# Patient Record
Sex: MALE | Race: WHITE | Employment: FULL TIME | URBAN - METROPOLITAN AREA
[De-identification: names, ages, dates, MRNs, and addresses within clinical notes are randomized per-mention and may not be internally consistent; named-entity substitution may affect disease eponyms.]

---

## 2018-05-01 ENCOUNTER — RECORDS - HEALTHEAST (OUTPATIENT)
Dept: LAB | Facility: CLINIC | Age: 23
End: 2018-05-01

## 2018-05-01 LAB
ALBUMIN SERPL-MCNC: 4.3 G/DL (ref 3.5–5)
ANION GAP SERPL CALCULATED.3IONS-SCNC: 12 MMOL/L (ref 5–18)
BUN SERPL-MCNC: 19 MG/DL (ref 8–22)
CALCIUM SERPL-MCNC: 10 MG/DL (ref 8.5–10.5)
CHLORIDE BLD-SCNC: 102 MMOL/L (ref 98–107)
CO2 SERPL-SCNC: 25 MMOL/L (ref 22–31)
CREAT SERPL-MCNC: 0.95 MG/DL (ref 0.7–1.3)
GFR SERPL CREATININE-BSD FRML MDRD: >60 ML/MIN/1.73M2
GLUCOSE BLD-MCNC: 85 MG/DL (ref 70–125)
PHOSPHATE SERPL-MCNC: 3.8 MG/DL (ref 2.5–4.5)
POTASSIUM BLD-SCNC: 4.1 MMOL/L (ref 3.5–5)
SODIUM SERPL-SCNC: 139 MMOL/L (ref 136–145)

## 2019-12-05 ENCOUNTER — HOSPITAL ENCOUNTER (EMERGENCY)
Facility: CLINIC | Age: 24
Discharge: HOME | End: 2019-12-05
Attending: FAMILY MEDICINE | Admitting: FAMILY MEDICINE
Payer: COMMERCIAL

## 2019-12-05 VITALS
SYSTOLIC BLOOD PRESSURE: 143 MMHG | DIASTOLIC BLOOD PRESSURE: 84 MMHG | OXYGEN SATURATION: 99 % | HEART RATE: 84 BPM | TEMPERATURE: 98.9 F | RESPIRATION RATE: 20 BRPM

## 2019-12-05 DIAGNOSIS — R45.851 SUICIDAL IDEATION: ICD-10-CM

## 2019-12-05 DIAGNOSIS — F12.10 MARIJUANA ABUSE: ICD-10-CM

## 2019-12-05 DIAGNOSIS — F41.1 GAD (GENERALIZED ANXIETY DISORDER): ICD-10-CM

## 2019-12-05 DIAGNOSIS — F10.10 ALCOHOL ABUSE: ICD-10-CM

## 2019-12-05 PROCEDURE — 90791 PSYCH DIAGNOSTIC EVALUATION: CPT

## 2019-12-05 PROCEDURE — 99285 EMERGENCY DEPT VISIT HI MDM: CPT | Mod: 25

## 2019-12-05 PROCEDURE — 99284 EMERGENCY DEPT VISIT MOD MDM: CPT | Mod: Z6 | Performed by: FAMILY MEDICINE

## 2019-12-05 PROCEDURE — 25000132 ZZH RX MED GY IP 250 OP 250 PS 637: Performed by: FAMILY MEDICINE

## 2019-12-05 RX ORDER — HYDROXYZINE HYDROCHLORIDE 25 MG/1
50 TABLET, FILM COATED ORAL ONCE
Status: COMPLETED | OUTPATIENT
Start: 2019-12-05 | End: 2019-12-05

## 2019-12-05 RX ORDER — HYDROXYZINE HYDROCHLORIDE 25 MG/1
25-50 TABLET, FILM COATED ORAL EVERY 6 HOURS PRN
Qty: 28 TABLET | Refills: 0 | Status: SHIPPED | OUTPATIENT
Start: 2019-12-05

## 2019-12-05 RX ADMIN — HYDROXYZINE HYDROCHLORIDE 50 MG: 25 TABLET, FILM COATED ORAL at 21:02

## 2019-12-05 ASSESSMENT — ENCOUNTER SYMPTOMS
DYSURIA: 0
NAUSEA: 0
SINUS PRESSURE: 0
DYSPHORIC MOOD: 1
DIAPHORESIS: 0
SHORTNESS OF BREATH: 0
SORE THROAT: 0
COUGH: 0
CHILLS: 0
FREQUENCY: 0
WHEEZING: 0
VOMITING: 0
NERVOUS/ANXIOUS: 1
BLOOD IN STOOL: 0
ABDOMINAL PAIN: 0
CONSTIPATION: 0
FEVER: 0
HEADACHES: 1
DIARRHEA: 0
PALPITATIONS: 0

## 2019-12-05 NOTE — ED AVS SNAPSHOT
[facility] Emergency Department  [address] [address]  Phone:  [phone]  Fax:  [phone]                                    Souleymane Zurita   MRN: [id number]    Department:  [facility] Emergency Department   Date of Visit:  12/5/2019           After Visit Summary Signature Page    I have received my discharge instructions, and my questions have been answered. I have discussed any challenges I see with this plan with the nurse or doctor.    ..........................................................................................................................................  Patient/Patient Representative Signature      ..........................................................................................................................................  Patient Representative Print Name and Relationship to Patient    ..................................................               ................................................  Date                                   Time    ..........................................................................................................................................  Reviewed by Signature/Title    ...................................................              ..............................................  Date                                               Time          22EPIC Rev 08/18

## 2019-12-06 NOTE — ED PROVIDER NOTES
History     Chief Complaint   Patient presents with     Anxiety     HPI  Souleymane Zurita is a 24 year old male who presents with history of alcohol abuse and through chemotherapy treatment successfully sober for 8 months this past year then relapsed and returned to treatment but felt uncomfortable with the second treatment program and left early.  Since that time is had intermittent alcohol use at least a couple times a week.  He notes that he also uses daily heavy marijuana use with 2 ounces per week often he has been out of this recently and without any money.  He has been gone from work for the last 3 months on leave and will be returning there in approximately 2 weeks.       Today after having used alcohol last night, vodka, then today feeling more anxious and upset and easily angered.  His dog ran off earlier and he became angry and punched the walls.  Many of his home utilities are turned off including his TV because he cannot pay the bill.  He can however live with his aunt and is considered doing this.  He is accompanied by his mother who is concerned for him.  He did have suicidal ideation today as he has had previously but has no planning or preparation no intent to do this.  Denies wanting to take pills or use weapons and he has no weapons in the home.  No prior suicidal attempts in the past.  No hallucinations.  He has had significant generalized anxiety symptoms for some time with periodic sense of panic in which he feels like he is going to die.  These are without obvious trigger.  Symptoms were controlled with hydroxyzine although he recently ran out of this.  He also was started on Effexor and has been on this for 1 week but taking it during the daytime and does not feel right when he is on it.  Feels worse now that he is taking 2 tabs per day.        Allergies:  No Known Allergies    Problem List:    There are no active problems to display for this patient.       Past Medical History:    No past  medical history on file.    Past Surgical History:    No past surgical history on file.    Family History:    No family history on file.    Social History:  Marital Status:    Social History     Tobacco Use     Smoking status: Not on file   Substance Use Topics     Alcohol use: Not on file     Drug use: Not on file        Medications:    No current outpatient medications on file.        Review of Systems   Constitutional: Negative for chills, diaphoresis and fever.   HENT: Negative for ear pain, sinus pressure and sore throat.    Eyes: Negative for visual disturbance.   Respiratory: Negative for cough, shortness of breath and wheezing.    Cardiovascular: Negative for chest pain and palpitations.   Gastrointestinal: Negative for abdominal pain, blood in stool, constipation, diarrhea, nausea and vomiting.   Genitourinary: Negative for dysuria, frequency and urgency.   Skin: Negative for rash.   Neurological: Positive for headaches.   Psychiatric/Behavioral: Positive for dysphoric mood, self-injury and suicidal ideas. The patient is nervous/anxious.    All other systems reviewed and are negative.      Physical Exam          Physical Exam  Constitutional:       General: He is not in acute distress.     Appearance: He is not ill-appearing.   HENT:      Nose: Nose normal.      Mouth/Throat:      Mouth: Mucous membranes are moist.      Pharynx: No oropharyngeal exudate.   Eyes:      Conjunctiva/sclera: Conjunctivae normal.   Neck:      Musculoskeletal: Neck supple.   Cardiovascular:      Rate and Rhythm: Normal rate and regular rhythm.      Pulses: Normal pulses.      Heart sounds: No murmur. No friction rub. No gallop.    Pulmonary:      Effort: Pulmonary effort is normal. No respiratory distress.      Breath sounds: Normal breath sounds. No stridor. No wheezing or rhonchi.   Abdominal:      General: Abdomen is flat. Bowel sounds are normal. There is no distension.      Palpations: There is no mass.      Tenderness: There  is no abdominal tenderness.      Hernia: No hernia is present.   Musculoskeletal:      Right lower leg: No edema.      Left lower leg: No edema.   Skin:     Coloration: Skin is not pale.      Findings: No rash.   Neurological:      Mental Status: He is alert and oriented to person, place, and time.      Sensory: No sensory deficit.      Motor: No weakness.   Psychiatric:         Mood and Affect: Mood is anxious and depressed. Affect is not labile, blunt, angry or tearful.         Speech: He is communicative. Speech is not rapid and pressured, delayed, slurred or tangential.         Behavior: Behavior is not agitated, slowed, aggressive or withdrawn.         Thought Content: Thought content includes suicidal ideation. Thought content does not include suicidal plan.         Cognition and Memory: Cognition is not impaired. Memory is not impaired.       Bilateral knuckles are slightly abraded from punching walls today.  No obvious signs of fracture full range of motion full-strength.      ED Course        Procedures               Critical Care time:  none               No results found for this or any previous visit (from the past 24 hour(s)).    Medications   hydrOXYzine (ATARAX) tablet 50 mg (has no administration in time range)       Assessments & Plan (with Medical Decision Making)     MDM: Souleymane Zurita is a 24 year old male who presented this evening for progressive anxiety disorder that had been treated with Vistaril successfully until he ran out of Vistaril.  Had been planning to see mental health and has been self treating his anxiety with both alcohol for which he went through treatment and has relapsed and with marijuana with heavy use on a daily basis.  He had also had suicidal ideation today without planning or preparation and had this before and felt safe today.  He has no weapons in the home or other significant risks.  He had recently been started on Effexor and was titrating upwards but had side  effects on this.  We did discuss taking it at nighttime instead of the day.    He met with myself as well as with Lizett at the [facility] and both of us agreed that the patient appears to be safe.  Today he was accompanied by his mother.  He has resources around him including several family members we can stay with at this point.  Lizett is set up [facility] to contact the patient tomorrow to help set up what is likely going to be intensive day treatment dual diagnosis.  I discussed this with the patient's mother and the patien and he would like to pursue this.t he was given Vistaril for home.  We will have him take the Effexor at nighttime.  He is encouraged to return immediately for suicidal planning or preparation or increased suicidal ideation.    I have reviewed the nursing notes.    I have reviewed the findings, diagnosis, plan and need for follow up with the patient.       New Prescriptions    No medications on file       Final diagnoses:   DEVORAH (generalized anxiety disorder) - I recommend intensive day treatment - expect phone call from mental health tomorrow,  see also [facility] in discharge. take atarax as needed. stay on effexir at night. consider one tab 37,5 mg nightly starting tonight.  if waiting until tomorrow night then take 2 tomorrow.   Marijuana abuse   Alcohol abuse - avoid alcohol and marijuana and follow-up mental health , prefer dual diagnosis   Suicidal ideation - make sure there are no weapons in the home.  return immed for planning or preparation. follow-up mental health and primary provider in the next week       12/5/2019   [facility] EMERGENCY DEPARTMENT     Bin Huang MD  12/06/19 [id number]

## 2019-12-06 NOTE — DISCHARGE INSTRUCTIONS
ICD-10-CM    1. DEVORAH (generalized anxiety disorder) F41.1     I recommend intensive day treatment - expect phone call from mental health tomorrow,  see also [facility] in discharge. take atarax as needed. stay on effexir at night. consider one tab 37,5 mg nightly starting tonight.  if waiting until tomorrow night then take 2 tomorrow.   2. Marijuana abuse F12.10    3. Alcohol abuse F10.10     avoid alcohol and marijuana and follow-up mental health , prefer dual diagnosis   4. Suicidal ideation R45.851     make sure there are no weapons in the home.  return immed for planning or preparation. follow-up mental health and primary provider in the next week

## 2019-12-06 NOTE — ED TRIAGE NOTES
Pt has problems with depression and anxiety, pt denies thoughts of harming himself now, says anxiety is the problem and has been very bothersome for him.

## 2019-12-06 NOTE — ED NOTES
Went through treatment for etoh abuse twice, 8 months ago and 2 months ago  Pt was sober from alcohol for two months  Relapsed on alcohol last night, had anxiety from running out of marijuana, last drink 1400 today  States his dog ran away, punched the walls.  Laid off work without pay  Anxiety and depression today, ran out of vistaril 3 days ago.   Taking effexor QAM  Suicidal thoughts earlier today, no plan, currently denies SI at this time  States he wishes to hurt his dad but has no plan

## 2019-12-06 NOTE — ED TRIAGE NOTES
Pt has been trying to get a psych eval, has been through tx for ETOH, was on vistaril for anxiety but ran out.

## 2020-05-22 ENCOUNTER — HOSPITAL ENCOUNTER (OUTPATIENT)
Dept: BEHAVIORAL HEALTH | Facility: CLINIC | Age: 25
Discharge: HOME | End: 2020-05-22
Attending: FAMILY MEDICINE | Admitting: FAMILY MEDICINE
Payer: MEDICAID

## 2020-05-22 PROCEDURE — 90791 PSYCH DIAGNOSTIC EVALUATION: CPT | Mod: GT

## 2020-05-22 ASSESSMENT — ANXIETY QUESTIONNAIRES
2. NOT BEING ABLE TO STOP OR CONTROL WORRYING: SEVERAL DAYS
3. WORRYING TOO MUCH ABOUT DIFFERENT THINGS: SEVERAL DAYS
5. BEING SO RESTLESS THAT IT IS HARD TO SIT STILL: SEVERAL DAYS
1. FEELING NERVOUS, ANXIOUS, OR ON EDGE: NEARLY EVERY DAY
6. BECOMING EASILY ANNOYED OR IRRITABLE: NEARLY EVERY DAY
GAD7 TOTAL SCORE: 12
IF YOU CHECKED OFF ANY PROBLEMS ON THIS QUESTIONNAIRE, HOW DIFFICULT HAVE THESE PROBLEMS MADE IT FOR YOU TO DO YOUR WORK, TAKE CARE OF THINGS AT HOME, OR GET ALONG WITH OTHER PEOPLE: SOMEWHAT DIFFICULT
7. FEELING AFRAID AS IF SOMETHING AWFUL MIGHT HAPPEN: SEVERAL DAYS

## 2020-05-22 ASSESSMENT — PATIENT HEALTH QUESTIONNAIRE - PHQ9
5. POOR APPETITE OR OVEREATING: MORE THAN HALF THE DAYS
SUM OF ALL RESPONSES TO PHQ QUESTIONS 1-9: 22

## 2020-05-22 ASSESSMENT — COLUMBIA-SUICIDE SEVERITY RATING SCALE - C-SSRS
TOTAL  NUMBER OF ABORTED OR SELF INTERRUPTED ATTEMPTS PAST 3 MONTHS: NO
ATTEMPT PAST THREE MONTHS: NO
TOTAL  NUMBER OF ABORTED OR SELF INTERRUPTED ATTEMPTS LIFETIME: NO
ATTEMPT LIFETIME: NO
3. HAVE YOU BEEN THINKING ABOUT HOW YOU MIGHT KILL YOURSELF?: YES
TOTAL  NUMBER OF INTERRUPTED ATTEMPTS LIFETIME: NO
4. HAVE YOU HAD THESE THOUGHTS AND HAD SOME INTENTION OF ACTING ON THEM?: NO
5. HAVE YOU STARTED TO WORK OUT OR WORKED OUT THE DETAILS OF HOW TO KILL YOURSELF? DO YOU INTEND TO CARRY OUT THIS PLAN?: NO
1. HAVE YOU WISHED YOU WERE DEAD OR WISHED YOU COULD GO TO SLEEP AND NOT WAKE UP?: NO
TOTAL  NUMBER OF INTERRUPTED ATTEMPTS PAST 3 MONTHS: NO
2. HAVE YOU ACTUALLY HAD ANY THOUGHTS OF KILLING YOURSELF?: YES
1. IN THE PAST MONTH, HAVE YOU WISHED YOU WERE DEAD OR WISHED YOU COULD GO TO SLEEP AND NOT WAKE UP?: NO
2. HAVE YOU ACTUALLY HAD ANY THOUGHTS OF KILLING YOURSELF?: YES
4. HAVE YOU HAD THESE THOUGHTS AND HAD SOME INTENTION OF ACTING ON THEM?: NO
6. HAVE YOU EVER DONE ANYTHING, STARTED TO DO ANYTHING, OR PREPARED TO DO ANYTHING TO END YOUR LIFE?: NO
5. HAVE YOU STARTED TO WORK OUT OR WORKED OUT THE DETAILS OF HOW TO KILL YOURSELF? DO YOU INTEND TO CARRY OUT THIS PLAN?: NO
6. HAVE YOU EVER DONE ANYTHING, STARTED TO DO ANYTHING, OR PREPARED TO DO ANYTHING TO END YOUR LIFE?: NO

## 2020-05-22 NOTE — PROGRESS NOTES
"Souleymane Zurita is a 25 year old male who is being evaluated via a billable video visit.      The patient has been notified of following:     \"This video visit will be conducted via a call between you and your physician/provider. We have found that certain health care needs can be provided without the need for an in-person physical exam.  This service lets us provide the care you need with a video conversation.  If a prescription is necessary we can send it directly to your pharmacy.  If lab work is needed we can place an order for that and you can then stop by our lab to have the test done at a later time.    Video visits are billed at different rates depending on your insurance coverage. Please reach out to your insurance provider with any questions.    If during the course of the call the physician/provider feels a video visit is not appropriate, you will not be charged for this service.\"    Patient has given verbal consent for Video visit? Yes    How would you like to obtain your AVS? Mail a copy    Patient would like the video invitation sent by: Send to e-mail at: [email or web address]    Will anyone else be joining your video visit? No        Video-Visit Details    Type of service:  Video Visit    Video Start Time: 1:30pm  Video End Time: 3:00 PM    Originating Location (pt. Location): Home    Distant Location (provider location):  [facility]     Platform used for Video Visit: PABLO Parnell       Adult Dual Diagnosis Day Treatment  Evaluator Name:  Danyelle Singh     Credentials:  PABLO CARDONA    PATIENT'S NAME: Souleymane Zurita  PREFERRED NAME: Pedro  PREFERRED PRONOUNS: He/Him  MRN:   [id number]  :   1995   ACCT. NUMBER: [id number]  DATE OF SERVICE: 20  START TIME: 1:30pm  END TIME: 3:00pm  PREFERRED PHONE: [email or web address] or [phone] (Mother's phone - patient doesn't have a phone right now)  May we leave a program related message: Yes (email or on mother's " "phone)    STANDARD ADULT DIAGNOSTIC ASSESSMENT    Telemedicine Visit: The patient's condition can be safely assessed and treated via synchronous audio and visual telemedicine encounter.      Reason for Telemedicine Visit: Services only offered telehealth    Originating Site (Patient Location): Patient's home    Distant Site (Provider Location): Provider Remote Setting    Consent:  The patient/guardian has verbally consented to: the potential risks and benefits of telemedicine (video visit) versus in person care; bill my insurance or make self-payment for services provided; and responsibility for payment of non-covered services.     Mode of Communication:  Video Conference via [employer]    As the provider I attest to compliance with applicable laws and regulations related to telemedicine.    Identifying Information:  Patient is a 25 year old, . The pronoun use throughout this assessment reflects the patient's chosen pronoun.  Patient was referred for an assessment by self. Patient attended the session alone.     Chief Complaint:   The reason for seeking services at this time is: \"Mental health problems and substance abuse problems\". Last drink was on Sunday, 5/17 with longest period of sobriety from alcohol being 7 months. The problem(s) began in middle school but roughly 2/3 years ago things got \"pretty bad\". Patient has attempted to resolve these concerns in the past through [facility] (inpatient x2 - most recent February 2020).    Does the client have any condition that is currently presenting as a potential to harm themselves or others (severe withdrawal, serious medical condition, severe emotional/behavioral problem)? No.  Proceed with assessment.    Social/Family History:  Patient reported they grew up in [place], [place]. They were raised by biological parents around 1st or 2nd grade. Biological father eventually remarried. Patient also reports a half sister older.  Patient reported that his " "childhood was \"okay\". Patient described their current relationships with family of origin as \"okay - mom, aunt and cousin are my biggest support\".      The patient describes their cultural background as \"I don't know much, honestly. I guess I am atheist if that counts\". Cultural influences and impact on patient's life structure, values, norms, and healthcare: parents  at early age, limited social support, suburban up-bringing. These factors will be addressed in the Preliminary Treatment plan. Patient identified their preferred language to be English. Patient reported they does not need the assistance of an  or other support involved in therapy.     Patient reported had no significant delays in developmental tasks \"but I did really bad in school, I just hated it\". Patient's highest education level was associate degree / vocational certificate - automotive science. Patient identified the following learning problems: attention and concentration. Modifications will not be used to assist communication in therapy. Patient reports they are able to understand written materials    Patient reported the following relationship history: Patient's current relationship status is single. Patient identified their sexual orientation as heterosexual. Patient reported having zero child(kana).     Patient's current living/housing situation involves staying in own home/apartment. They live with \"dog (Waldo Buckley) - Max\" and they report that housing is stable. Patient identified mother and aunt as part of their support system. Patient further reports \"I've never really had to many friends\". Patient identified the quality of the relationships with his mother and aunt as stable and meaningful.     Patient is currently unemployed - since January/February. Patient was working as an  for several years, then went to an [employer] show. Patient reports their finances are obtained through unemployment.  Patient " "does identify finances as a current stressor.      Patient reported that they have not been involved with the legal system. Patient denies being on probation / parole / under the jurisdiction of the court.      Patient's Strengths and Limitations:  Patient identified the following strengths or resources that will help them succeed in treatment: family support. Things that may interfere with the patient's success in treatment include: few friends, financial hardship and \"myself and getting in my own way\".     _______________________________________________  Personal and Family Medical History:   Patient did report a family history of mental health concerns. Patient reports family history includes Unknown/Adopted in his paternal grandmother..     Patient reported the following previous diagnoses which include(s): ADHD, an Anxiety Disorder and Depression. Patient reported symptoms began childhood. Patient has not received mental health services in the past: inpatient mental health services at [facility] (x2 - most recent February 2020). Psychiatric Hospitalizations: None. Patient denies a history of civil commitment. Currently, patient is not receiving other mental health services. These include none.   Patient has not had a physical exam to rule out medical causes for current symptoms. Date of last physical exam was within the past year. Client was encouraged to follow up with PCP if symptoms were to develop. The patient has a non-[facility] Primary Care Provider. Their PCP is [facility] \"whatever provider is available\".  Patient reports no current medical concerns. There are not significant appetite / nutritional concerns / weight changes. Patient does not report a history of head injury / trauma / cognitive impairment.     Patient reports not taking any current medications    Medication Adherence:  Patient reports No medications currently prescribed.    Patient Allergies:  No Known Allergies    Medical " History:  No past medical history on file.      Current Mental Status Exam:   Appearance:  Appropriate    Eye Contact:  Fair   Psychomotor:  Agitated       Gait / station:  no problem  Attitude / Demeanor: Cooperative  Guarded  Francisco J  Speech      Rate / Production: Normal/ Responsive      Volume:  Normal  volume      Language:  intact  Mood:   Apathetic  Affect:   Constricted  Flat    Thought Content: Clear   Thought Process: Coherent  [place]      Associations: No loosening of associations  Insight:   Fair   Judgment:  Poor  Orientation:  All  Attention/concentration:Short and Easily Distracted    Rating Scales:    PHQ9:    PHQ-9 SCORE 5/22/2020   PHQ-9 Total Score 22   ;    GAD7:    DEVORAH-7 SCORE 5/22/2020   Total Score 12     CGI:     First:Considering your total clinical experience with this particular patient population, how severe are the patient's symptoms at this time?: 5 (5/22/2020  1:44 PM)  ;    Most recentCompared to the patient's condition at the START of treatment, this patient's condition is: 4 (5/22/2020  1:44 PM)      Substance Use:  Patient reported no family history of chemical health issues. Patient has received chemical dependency treatment in the past at [facility] (x2). Patient has ever been to detox: [facility] - 2019/2020. Patient is not currently receiving any chemical dependency treatment.     Alcohol - Patient reports last drink was 5/17, prior was drinking about a pint a day, mostly consumed in the evening hours. Prior to treatment in February, patient reports drinking up to a half a liter. Per patient, his longest period of sobriety was 7 months after first treatment. Patient reports first began drinking at the age of 11.  Tobacco - Patient reports previous use of chewing tobacco with last use around 6 months of ago. Patient report using 1 can per 3/4 days. He reports beginning around the 10th grade and using until 6 months ago. Patient further reports, he would use primarily when  "consuming alcohol.  Marijuana - Patient reports current daily use of marijuana but reported to this writer he would abstain from use during DDP treatment. Patient currently using 1/4 oz per day and uses throughout the day. Patient reports his first use of mairjuana at age 10 and using since.   Patient reports caffeine - 4 cups of coffee per day and occasionally a cup of tea.  Patient denies cocaine/crack use.  Patient denies meth/amphetamine use.  Patient denies use of heroin  Patient denies use of other opiates.  Patient denies inhalant use  Patient denies use of benzodiazepines.  Patient denies use of hallucinogens.  Patient denies use of barbiturates, sedatives, or hypnotics.  Patient denies use of over the counter drugs.  Patient denies use of other substances.    CAGE- AID:   CAGE-AID (CAGE Questions Adapted to Include Drugs)    1. Have you ever felt you ought to cut down on your drinking or drug use?  Yes  2. Have people annoyed you by criticizing your drinking or drug use?  Yes  3. Have you felt bad or guilty about your drinking or drug use?  Yes  4. Have you ever had a drink or used drugs first thing in the morning to steady your nerves or to get rid of a hangover?  Yes    Scoring: Item responses on the CAGE-AID are scored 0 for \"no\" and 1 for \"yes\" answers. A higher score is an indication of alcohol problems. A total score of 2 or greater is considered clinically significant.   Patient reported the following problems as a result of their substance use: financial problems and occupational / vocational problems. Patient is concerned about substance use.     Patient reports experiencing the following withdrawal symptoms within the past 12 months: sweating, shaky/jittery/tremors, headache and nausea/vomiting and the following within the past 30 days: sweating, shaky/jittery/tremors, headache and nausea/vomiting.  Patients reports urges to use Marijuana / Hashish. Patient reports he has used more Alcohol than " "intended and over a longer period of time than intended. Patient reports he has had unsuccessful attempts to cut down or control use of Alcohol. Patient reports longest period of abstinence was 7 months and return to use was due to \"depressed\". Patient reports he has needed to use more Alcohol to achieve the same effect. Patient does  report diminished effect with use of same amount of Alcohol.     Patient does  report a great deal of time is spent in activities necessary to obtain, use, or recover from Alcohol effects. Patient does  report important social, occupational, or recreational activities are given up or reduced because of Alcohol use.  Marijuana / Hashish use is continued despite knowledge of having a persistent or recurrent physical or psychological problem that is likely to have caused or exacerbated by use. Patient reports the following problem behaviors while under the influence of substances. Patient reports their recovery goals are \"to feel better\".     Patient does not have other addictive behaviors he is concerned about.      Significant Losses / Trauma / Abuse / Neglect Issues:   Patient did not serve in the .  There are indications or report of significant loss, trauma, abuse or neglect issues related to: None identified  Concerns for possible neglect are not present.     Safety Assessment:  Patient reports no hx of suicidal attempt, plan or intent. Patient does report recurrent suicidal intrusive thoughts and ideations. Intrustive thoughts first began 1-2 years ago and has been experiencing them daily, lasting a few minutes in duration. On a scale of 1(mild) - 5(severe - act upon), patient currently experiencing a 3. Patient further reports when he was drinking, he would rate the thoughts at a 5.  Current Safety Concerns:  Cass Suicide Severity Rating Scale (Lifetime/Recent):  Please see doc flowsheets as scaling would not auto-populate in  Patient denies current homicidal ideation " "and behaviors.  Patient denies current self-injurious ideation and behaviors.    Patient denied risk behaviors associated with substance use.  Patient denies any high risk behaviors associated with mental health symptoms.  Patient reports the following current concerns for their personal safety: None.  Patient reports there are firearms in the house. No access to firearms.     History of Safety Concerns:   Patient denied a history of homicidal ideation.     Patient denied a history of personal safety concerns.    Patient denied a history of assaultive behaviors.    Patient denied a history of assaultive behaviors.     Patient denied a history of risk behaviors associated with substance use.  Patient denies any history of high risk behaviors associated with mental health symptoms.  Patient reports the following protective factors: dedication to family/friends    Risk Plan:  See Preliminary Treatment Plan for Safety and Risk Management Plan    Review of Symptoms per patient report:  Depression: Change in sleep, Lack of interest, Difficulties concentrating, Psychomotor slowing or agitation, Suicidal ideation, Feelings of hopelessness, Feeling sad, down, or depressed and Withdrawn  Jodi:  No Symptoms  Psychosis: No Symptoms  Anxiety: Excessive worry, Nervousness, Physical complaints, such as headaches, stomachaches, muscle tension, Poor concentration and Irritability  Panic:  Palpitations, Shortness of breath, Sense of impending doom and Hot or cold flashes - last one was 2 months (feels that medication caused them)  Post Traumatic Stress Disorder:  No Symptoms   Eating Disorder: No Symptoms  ADD / ADHD:  No symptoms  Conduct Disorder: No symptoms  Autism Spectrum Disorder: No symptoms - per patient self-report  Obsessive Compulsive Disorder: No Symptoms    Patient reports the following compulsive behaviors and treatment history: None identified - \"I enjoy video games but don't feel it is a problem\".      Diagnostic " Criteria:   A. Excessive anxiety and worry about a number of events or activities (such as work or school performance).   B. The person finds it difficult to control the worry.  C. Select 3 or more symptoms (required for diagnosis). Only one item is required in children.   - Restlessness or feeling keyed up or on edge.    - Being easily fatigued.    - Difficulty concentrating or mind going blank.    - Irritability.    - Muscle tension.    - Sleep disturbance (difficulty falling or staying asleep, or restless unsatisfying sleep).   D. The focus of the anxiety and worry is not confined to features of an Axis I disorder.  E. The anxiety, worry, or physical symptoms cause clinically significant distress or impairment in social, occupational, or other important areas of functioning.   CRITERIA (A-C) REPRESENT A MAJOR DEPRESSIVE EPISODE - SELECT THESE CRITERIA  A) Recurrent episode(s) - symptoms have been present during the same 2-week period and represent a change from previous functioning 5 or more symptoms (required for diagnosis)   - Depressed mood. Note: In children and adolescents, can be irritable mood.     - Diminished interest or pleasure in all, or almost all, activities.    - Significant weight gaindecrease in appetite.    - Increased sleep.    - Psychomotor activity retardation.    - Fatigue or loss of energy.    - Feelings of worthlessness or inappropriate and excessive guilt.    - Diminished ability to think or concentrate, or indecisiveness.    - Recurrent thoughts of death (not just fear of dying), recurrent suicidal ideation without a specific plan, or a suicide attempt or a specific plan for committing suicide.   B) The symptoms cause clinically significant distress or impairment in social, occupational, or other important areas of functioning  C) The episode is not attributable to the physiological effects of a substance or to another medical condition  D) The occurence of major depressive episode is  not better explained by other thought / psychotic disorders  OP BEH PAULO CRITERIA: Substance is often taken in larger amounts or over a longer period than was intended.  Met for:  Alcohol and Cannabis, There is persistent desire or unsuccessful efforts to cut down or control use of the substance.  Met for:  Alcohol and Cannabis,  A great deal of time is spent in activities necessary to obtain the substance, use the substance, or recover from its effects.  Met for:  Alcohol and Cannabis, Craving, or a strong desire or urge to use the substance.  Met for:  Alcohol and Cannabis, Recurrent use of the substance resulting in a failure to fulfill major role obligations at work, school, or home.  Met for:  Alcohol and Cannabis, Continued use of the substance despite having persistent or recurrent social or interpersonal problems caused or exacerbated by the effects of its use.  Met for:  Alcohol and Cannabis, Important social, occupational, or recreational activities are given up or reduced because of the substance.  Met for:  Alcohol and Cannabis, Recurrent use of the substance in which it is physically hazardous.  Met for:  Alcohol and Cannabis, Use of the substance is continued despite knowledge of having a persistent or recurrent physical or psychological problem that is likely to have been cause or exacerbated by the substance.  Met for:  Alcohol and Cannabis, Tolerance:  either a need for markedly increased amounts of the substance to achieve the desired effect or a markedly diminished effect with continued use of the dame amount of the substance.  Met for:  Alcohol and Cannabis, Withdrawal:  either patient endorses characteristic withdrawal syndrome for the substance or the substance (or closely related substance) is taken to relieve or avoid withdrawal symptoms.  Met for:  Alcohol and Cannabis    Functional Status:  Patient reports the following functional impairments: health maintenance, management of the household  "and or completion of tasks, money management, operation of a motor vehicle, relationship(s), self-care, social interactions and work / vocational responsibilities.     WHODAS:   WHODAS 2.0 Total Score 5/22/2020   Total Score 26       Clinical Summary:  1. Reason for assessment: \"I want to feel better\".  2. Psychosocial, Cultural and Contextual Factors: Very limited support, unemployed, Atheist,  Owns home/stable housing,   3. 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  4. R/O: Autism Spectrum Disorder 299.00(F84.0)  Associated with another neurodevelopmental, mental or behavioral disorder and Attention-Deficit/Hyperactivity Disorder  314.01 (F90.2) Combined presentation  300.01 (F41.0) Panic Disorder due to hx of panic attacks vs medication induced. Additionally, R/O spectrum disorder vs ADHD (childhood working diagnosis not formally diagnosed; was on medication) due to hx of poor social cutes and relationships, limited receptive and expressive emotion, limiting self from social settings or interactions, learning difficulties throughout school years, concrete though processes.  6. Prognosis: Relieve Acute Symptoms.  7. Likely consequences of symptoms if not treated: Decompensation of mental health and daily living.  8. Client strengths include:  \"I'm good at fixing things\"     Recommendations:     1. Plan for Safety and Risk Management:A safety and risk management plan has been developed including: Patient consented to co-developed safety plan.  Safety and risk management plan was completed.  Patient agreed to use safety plan should any safety concerns arise.  A " copy was given to the patient..  Report to child / adult protection services was NA.     2. Patient's identified no needs at this time but will notify staff if any needs arise.     3. Initial Treatment will focus on: Mood Instability - emotional regulation and distress tolerance skill building  Alcohol / Substance Use - abstain from substances, develop healthy coping strategies and build a sober network/support system.     4. Resources/Service Plan:       services are not indicated.     Modifications to assist communication are not indicated.     Additional disability accommodations are not indicated.      5. Collaboration:  Collaboration / coordination of treatment will be initiated with the following support professionals: primary care physician.      6.  Referrals:  The following referral(s) will be initiated: Day Treatment, [facility]. Next Scheduled Appointment: Tuesday, 5/26/2020.  A Release of Information has been obtained for the following: Emergency Contact, PCP, Insurance.    7. PAULO: Recommendations: DDP.  Patient reports they are willing to follow these recommendations. Patient does not have a history of opiate use.    8. Records were reviewed at time of assessment.  Information in this assessment was obtained from the medical record and provided by patient who is a limited historian.   Patient will have open access to their mental health medical record.      Eval type:  Dual Diagnosis    Staff Name/Credentials:  DULCE Jackson, PABLO  May 22, 2020

## 2020-05-22 NOTE — PROGRESS NOTES
"Outpatient Mental Health Services - Adult    MY COPING PLAN FOR SAFETY    PATIENT'S NAME: Souleymane Zurita  MRN:   [id number]  SAFETY PLAN:  Step 1: Warning signs / cues (Thoughts, images, mood, situation, behavior) that a crisis may be developing:    Thoughts: \"I can't do this anymore\"    Images: None identified    Thinking Processes: ruminations (can't stop thinking about my problems): \"focus on what didn't go well during the day\" and intrusive thoughts (bothersome, unwanted thoughts that come out of nowhere): suicidal ideation    Mood: worsening depression and mood swings    Behaviors: isolating/withdrawing , using drugs, not taking care of myself, not taking care of my responsibilities and sleeping too much    Situations: None identified   Step 2: Coping strategies - Things I can do to take my mind off of my problems without contacting another person (relaxation technique, physical activity):    Distress Tolerance Strategies:  Boxing, take dog for walk    Physical Activities: go for a walk    Focus on helpful thoughts:  \"Thinking of nothing, clearing my mind is helpful\"  Step 3: People and social settings that provide distraction:   Name: Cousin (Geovanni)    Boxing gym   Step 4: Remind myself of people and things that are important to me and worth living for:  Family, Dog  Step 5: When I am in crisis, I can ask these people to help me use my safety plan:   Name: Family   Step 6: Making the environment safe:     \"It is as safe as possible right now, my family has made sure of that\"  Step 7: Professionals or agencies I can contact during a crisis:    Suicide Prevention Lifeline: 5-371-847-OAOI (5104)    Crisis Text Line Service (available 24 hours a day, 7 days a week): Text [place] to 092743    Call  **CRISIS (424560) from a cell phone to talk to a team of professionals who can help you.  Crisis Services [facility]: Phone Number:   [place]     [phone]   [place]    [phone]   [place]    [phone]   [place]" [phone]   [place]    [phone]   [place] [phone]   [place]     [phone]       Call 911 or go to my nearest emergency department.     I helped develop this safety plan and agree to use it when needed.  I have been given a copy of this plan.      Client signature _________________________________________________________________  Today s date:  5/22/2020  Adapted from Safety Plan Template 2008 Saima Campos and Mino Melgoza is reprinted with the express permission of the authors.  No portion of the Safety Plan Template may be reproduced without the express, written permission.  You can contact the authors at [email or web address]@[email or web address] or [email or web address].

## 2020-05-23 ASSESSMENT — ANXIETY QUESTIONNAIRES: GAD7 TOTAL SCORE: 12

## 2020-05-26 ENCOUNTER — HOSPITAL ENCOUNTER (OUTPATIENT)
Dept: BEHAVIORAL HEALTH | Facility: CLINIC | Age: 25
End: 2020-05-26
Attending: PSYCHIATRY & NEUROLOGY
Payer: MEDICAID

## 2020-05-26 ENCOUNTER — BEH TREATMENT PLAN (OUTPATIENT)
Dept: BEHAVIORAL HEALTH | Facility: CLINIC | Age: 25
End: 2020-05-26
Attending: PSYCHIATRY & NEUROLOGY

## 2020-05-26 ENCOUNTER — TELEPHONE (OUTPATIENT)
Dept: BEHAVIORAL HEALTH | Facility: CLINIC | Age: 25
End: 2020-05-26

## 2020-05-26 PROBLEM — F33.2 MAJOR DEPRESSIVE DISORDER, RECURRENT EPISODE, SEVERE (H): Status: ACTIVE | Noted: 2020-05-26

## 2020-05-26 PROCEDURE — 90853 GROUP PSYCHOTHERAPY: CPT | Mod: GT | Performed by: MARRIAGE & FAMILY THERAPIST

## 2020-05-26 PROCEDURE — G0177 OPPS/PHP; TRAIN & EDUC SERV: HCPCS | Mod: GT | Performed by: OCCUPATIONAL THERAPIST

## 2020-05-26 PROCEDURE — 90853 GROUP PSYCHOTHERAPY: CPT | Mod: GT | Performed by: COUNSELOR

## 2020-05-26 ASSESSMENT — PATIENT HEALTH QUESTIONNAIRE - PHQ9
5. POOR APPETITE OR OVEREATING: NEARLY EVERY DAY
SUM OF ALL RESPONSES TO PHQ QUESTIONS 1-9: 19

## 2020-05-26 ASSESSMENT — ANXIETY QUESTIONNAIRES
2. NOT BEING ABLE TO STOP OR CONTROL WORRYING: SEVERAL DAYS
5. BEING SO RESTLESS THAT IT IS HARD TO SIT STILL: MORE THAN HALF THE DAYS
GAD7 TOTAL SCORE: 14
6. BECOMING EASILY ANNOYED OR IRRITABLE: NEARLY EVERY DAY
3. WORRYING TOO MUCH ABOUT DIFFERENT THINGS: SEVERAL DAYS
7. FEELING AFRAID AS IF SOMETHING AWFUL MIGHT HAPPEN: SEVERAL DAYS
IF YOU CHECKED OFF ANY PROBLEMS ON THIS QUESTIONNAIRE, HOW DIFFICULT HAVE THESE PROBLEMS MADE IT FOR YOU TO DO YOUR WORK, TAKE CARE OF THINGS AT HOME, OR GET ALONG WITH OTHER PEOPLE: EXTREMELY DIFFICULT
1. FEELING NERVOUS, ANXIOUS, OR ON EDGE: NEARLY EVERY DAY

## 2020-05-26 NOTE — GROUP NOTE
Psychoeducation Group Note    PATIENT'S NAME: Souleymane Zurita  MRN:   [id number]  :   1995  ACCT. NUMBER: [id number]  DATE OF SERVICE: 20  START TIME: 11:00 AM  END TIME: 11:50 AM  FACILITATOR: Henry Greene OTR/L  TOPIC: MH Life Skills Group: Sensory Approaches in Mental Health  Adult Dual Diagnosis Day Treatment  TRACK: 3    NUMBER OF PARTICIPANTS: 3    Telemedicine Visit: The patient's condition can be safely assessed and treated via synchronous audio and visual telemedicine encounter.      Reason for Telemedicine Visit: Services only offered telehealth    Originating Site (Patient Location): Patient's home    Distant Site (Provider Location): [facility]: [facility]    Consent:  The patient/guardian has verbally consented to: the potential risks and benefits of telemedicine (video visit) versus in person care; bill my insurance or make self-payment for services provided; and responsibility for payment of non-covered services.     Mode of Communication:  Video Conference via [employer]    As the provider I attest to compliance with applicable laws and regulations related to telemedicine.      Summary of Group / Topics Discussed:  Sensory Approaches in Mental Health:  Sensory Enhanced Mindfulness: Patients were taught and provided with an opportunity to explore and practice how using sensory enhanced mindfulness practices can help them stay grounded in the present moment as a way to manage mental health symptoms and stressors.         Patient Session Goals / Objectives:    Identified how using sensory enhanced mindfulness practices can be used for grounding, stress management, and self regulation      Improved awareness of different types of sensory enhanced mindfulness activities that assist with healthy coping of stress and symptoms      Established a plan for practice of these skills in their own environments    Practiced and reflected on how to generalize taught skills to  their everyday life        Patient Participation / Response:  Fully participated with the group by sharing personal reflections / insights and openly received / provided feedback with other participants.    Patient presentation: easily engaged and shared some practices, also noting need to try to establish a routine of these practices, Verbalized understanding of content and Patient would benefit from additional opportunities to practice the content to be able to generalize it to their everyday life with increased intentionality, consistency, and efficacy in support of their psychiatric recovery    Treatment Plan:  Patient has an initial individualized treatment plan that was created as part of their diagnostic assessment / admission process.  A master individualized treatment plan is in the process of being developed with the patient and multi-disciplinary care team.    Henry Greene OTR/SCOTT

## 2020-05-26 NOTE — GROUP NOTE
Psychotherapy Group Note    PATIENT'S NAME: Souleymane Zurita  MRN:   [id number]  :   1995  ACCT. NUMBER: [id number]  DATE OF SERVICE: 20  START TIME:  1:00 PM  END TIME:  1:50 PM  FACILITATOR: Shelby Vickers LMFT  TOPIC:  EBP Group: DDP Relapse Prevention  Adult Dual Diagnosis Day Treatment  TRACK: 3    NUMBER OF PARTICIPANTS: 3    Telemedicine Visit: The patient's condition can be safely assessed and treated via synchronous audio and visual telemedicine encounter.      Reason for Telemedicine Visit: Services only offered telehealth    Originating Site (Patient Location): Patient's home    Distant Site (Provider Location): Provider Remote Setting    Consent:  The patient/guardian has verbally consented to: the potential risks and benefits of telemedicine (video visit) versus in person care; bill my insurance or make self-payment for services provided; and responsibility for payment of non-covered services.     Mode of Communication:  Video Conference via [employer]    As the provider I attest to compliance with applicable laws and regulations related to telemedicine.   Summary of Group / Topics Discussed:  DDP Relapse Prevention: Support Groups and Community Resources: Patients explored the different support groups and community resources that aid in recovery.  Patients examined their attitudes and preconceived notions about support groups including identifying challenges and barriers to participation and attendance in community supports. The importance of attending additional supports in the recovery journey was discussed.    Patient Session Goals / Objectives:    Identified reasons why support groups/community resources are helpful to recovery    Identified the challenges and barriers to participation and attendance to support groups/community resources    [place] the differences between different types of support groups/community resources    Searched and found a support group/community  resource that patient is willing to explore       Patient Participation / Response:  Moderately participated, sharing some personal reflections / insights and adequately adequately received / provided feedback with other participants.    Demonstrated understanding of topics discussed through group discussion and participation and Demonstrated understanding of utilizing relapse prevention skills to manage urges and maintain sobriety    Treatment Plan:  Patient has an initial individualized treatment plan that was created as part of their diagnostic assessment / admission process.  A master individualized treatment plan is in the process of being developed with the patient and multi-disciplinary care team.    Sherri Vickers LMFT

## 2020-05-26 NOTE — TELEPHONE ENCOUNTER
Writer called patient contact number to complete RN Physical Health Screening Admission Questions.  Patient has no working phone at this time.    Writer to attempt to complete screening through [employer] at another time.    Bhavya Nash RN on 5/26/2020 at 3:46 PM

## 2020-05-26 NOTE — GROUP NOTE
Process Group Note    PATIENT'S NAME: Souleymane Zurita  MRN:   [id number]  :   1995  ACCT. 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  4. R/O: Autism Spectrum Disorder 299.00(F84.0)  Associated with another neurodevelopmental, mental or behavioral disorder and Attention-Deficit/Hyperactivity Disorder  314.01 (F90.2) Combined presentation  300.01 (F41.0) Panic Disorder due to hx of panic attacks vs medication induced. Additionally, R/O spectrum disorder vs ADHD       Adult Dual Diagnosis Day Treatment  TRACK: 3    NUMBER OF PARTICIPANTS: 3    Telemedicine Visit: The patient's condition can be safely assessed and treated via synchronous audio and visual telemedicine encounter.      Reason for Telemedicine Visit: Services only offered telehealth    Originating Site (Patient Location): Patient's home    Distant Site (Provider Location): Provider Remote Setting    Consent:  The patient/guardian has verbally consented to: the potential risks and benefits of telemedicine (video visit) versus in person care; bill my insurance or make self-payment for services provided; and responsibility for payment of non-covered services.     Mode of Communication:  Video Conference via [employer] (TTR)    As the provider I attest to compliance with applicable laws and regulations related to telemedicine.            Data:Symptom Management, Personal Safety and Abstinence/Relapse Prevention    Session content: At the start of this group, patients were invited to check in by identifying themselves, describing their current emotional status, and  "identifying issues to address in this group.   Area(s) of treatment focus addressed in this session included .    Souleymane reported feeling \"neutral\" today.  His goal for today is to stay sober.  He reported that he drank yesterday after having a panic attack.  He reported that beforehand, he didn't reach out for support or try any other skills - he just went straight to the liquor store.  He was able to process that it only made things worse but in the moment, it didn't seem like there was any other options.  He received a lot of support and encouragement from group peers who urged him to reach out to his mom today if he needs support, which he agreed to do.    Therapeutic Interventions/Treatment Strategies:  Psychotherapist offered support, feedback and validation and reinforced use of skills. Treatment modalities used include Motivational Interviewing, Cognitive Behavioral Therapy and Dialectical Behavioral Therapy. Interventions include Relapse Prevention: Facilitated understanding the importance of awareness of factors that contribute to relapse , Assisted patient in identifying personal vulnerabilities, thoughts, emotions, and situations that may lead to relapse  and Coached on skills to manage factors that contribute to relapse.    Assessment:    Patient response:   Patient responded to session by accepting feedback, giving feedback, listening, focusing on goals, being attentive, accepting support and appearing alert    Possible barriers to participation / learning include: and no barriers identified    Health Issues:   None reported       Substance Use Review:   Substance Use: alcohol .  and Last use: 5/25/20    Mental Status/Behavioral Observations  Appearance:   Appropriate   Eye Contact:   Good   Psychomotor Behavior: Normal   Attitude:   Cooperative   Orientation:   All  Speech   Rate / Production: Normal    Volume:  Normal   Mood:    Depressed   Affect:    Appropriate   Thought Content:   Clear  Thought " Form:  Coherent  Logical     Insight:    Good     Plan:     Safety Plan: No current safety concerns identified.  Recommended that patient call 911 or go to the local ED should there be a change in any of these risk factors.     Barriers to treatment: None identified    Patient Contracts (see media tab):  None    Substance Use: Provided encouragement towards sobriety    Provided support and affirmation for steps taken towards sobriety     Facilitated behavior chain analysis     Continue or Discharge: Patient will continue in Adult Dual Disorder Program (DDP) as planned. Patient is likely to benefit from learning and using skills as they work toward the goals identified in their treatment plan.      Diane Bush, [facility]  May 26, 2020

## 2020-05-26 NOTE — TELEPHONE ENCOUNTER
Spoke to mother about pt admitting to program, pt is not with mother right now, she will send him a message via messenger that a link will be sent at 10am to do admit via video

## 2020-05-26 NOTE — PROGRESS NOTES
"Outpatient Mental Health Services - Adult     MY COPING PLAN FOR SAFETY     PATIENT'S NAME:           Souleymane Zurita  MRN:                                  [id number]  SAFETY PLAN:  Step 1: Warning signs / cues (Thoughts, images, mood, situation, behavior) that a crisis may be developing:  ? Thoughts: \"I can't do this anymore\"  ? Images: None identified  ? Thinking Processes: ruminations (can't stop thinking about my problems): \"focus on what didn't go well during the day\" and intrusive thoughts (bothersome, unwanted thoughts that come out of nowhere): suicidal ideation  ? Mood: worsening depression and mood swings  ? Behaviors: isolating/withdrawing , using drugs, not taking care of myself, not taking care of my responsibilities and sleeping too much  ? Situations: None identified   Step 2: Coping strategies - Things I can do to take my mind off of my problems without contacting another person (relaxation technique, physical activity):  ? Distress Tolerance Strategies:  Boxing, take dog for walk  ? Physical Activities: go for a walk  ? Focus on helpful thoughts:  \"Thinking of nothing, clearing my mind is helpful\"  Step 3: People and social settings that provide distraction:                 Name: Cousin (Geovanni)                         Boxing gym           Step 4: Remind myself of people and things that are important to me and worth living for:  Family, Dog  Step 5: When I am in crisis, I can ask these people to help me use my safety plan:                 Name: Family       Step 6: Making the environment safe:   ? \"It is as safe as possible right now, my family has made sure of that\"  Step 7: Professionals or agencies I can contact during a crisis:  ? Suicide Prevention Lifeline: 2-089-308-TALK (1408)  ? Crisis Text Line Service (available 24 hours a day, 7 days a week): Text [place] to 282994  ? Call  **CRISIS (658441) from a cell phone to talk to a team of professionals who can help you.       Crisis Services By [facility]: " Phone Number:   [place]     [phone]   [place]    [phone]   [place]    [phone]   [place]    [phone]   [place]    [phone]   [place] [phone]   [place]     [phone]      ? Call 911 or go to my nearest emergency department.              I helped develop this safety plan and agree to use it when needed.  I have been given a copy of this plan.       Client signature _________________________________________________________________  Today s date:  5/22/2020

## 2020-05-26 NOTE — ADDENDUM NOTE
Encounter addended by: Danyelle Singh LICSW on: 5/26/2020 8:16 AM   Actions taken: Clinical Note Signed

## 2020-05-26 NOTE — PROGRESS NOTES
Admission Date: 5/26/2020    Identify any current concerns with potential impact to admission:     medication/medical concerns: reports is not currently taking any rx-stopped about 2-3 months ago     immediate safety concerns: denies si and sib    Does patient have safety plan? yes  Note: Please copy safety plan copied into BEH Encounter     Other (insurance/childcare/transportation/housing/planned absences/etc): reports no changes, reports stable housing    Patient's insurance is: medicaid . Does patient need appointment with provider? yes    If patient has Medical Assistance ([place]) is LOCUS and Functional Assessment completed? na    If patient is in Partial Hospitalization Program is LOCUS completed? na                                                                                      (delete if not applicable):   For Dual Disorder Outpatient Program:     Patient reported his last use of substances as: reports using last night-used alcohol-drank 350ml felt very sick, he threw up.     Patient reports the following concerns in regards to withdrawal/intoxication: reports not being under influence or withdrawal symptoms        Completed by: LV Rae [facility]

## 2020-05-27 ASSESSMENT — ANXIETY QUESTIONNAIRES: GAD7 TOTAL SCORE: 14

## 2020-05-28 ENCOUNTER — HOSPITAL ENCOUNTER (OUTPATIENT)
Dept: BEHAVIORAL HEALTH | Facility: CLINIC | Age: 25
End: 2020-05-28
Attending: PSYCHIATRY & NEUROLOGY
Payer: MEDICAID

## 2020-05-28 PROCEDURE — G0177 OPPS/PHP; TRAIN & EDUC SERV: HCPCS | Mod: GT

## 2020-05-28 PROCEDURE — 90853 GROUP PSYCHOTHERAPY: CPT | Mod: GT | Performed by: MARRIAGE & FAMILY THERAPIST

## 2020-05-28 PROCEDURE — G0177 OPPS/PHP; TRAIN & EDUC SERV: HCPCS | Mod: GT | Performed by: OCCUPATIONAL THERAPIST

## 2020-05-28 NOTE — GROUP NOTE
Psychoeducation Group Note    PATIENT'S NAME: Souleymane Zurita  MRN:   [id number]  :   1995  ACCT. NUMBER: [id number]  DATE OF SERVICE: 20  START TIME: 11:00 AM  END TIME: 11:50 AM  FACILITATOR: Bhavya Nash RN  TOPIC: DYLAN RN Group: Brain Health  Adult Dual Diagnosis Day Treatment  TRACK: 3    NUMBER OF PARTICIPANTS: 4    Telemedicine Visit: The patient's condition can be safely assessed and treated via synchronous audio and visual telemedicine encounter.      Reason for Telemedicine Visit: Services only offered telehealth    Originating Site (Patient Location): Patient's home    Distant Site (Provider Location): Provider Remote Setting    Consent:  The patient/guardian has verbally consented to: the potential risks and benefits of telemedicine (video visit) versus in person care; bill my insurance or make self-payment for services provided; and responsibility for payment of non-covered services.     Mode of Communication:  Video Conference via [email or web address]    As the provider I attest to compliance with applicable laws and regulations related to telemedicine.     Summary of Group / Topics Discussed:  Brain Health:  Pathophysiology of stress and anxiety (PART 2 OF 2): Patients were educated on the difference between stress, chronic stress, and anxiety. The anatomy and pathophysiology of the body/brain were reviewed to illustrate the immediate effects of stress/anxiety in the body and the long term effects and increased risks for chronic disease that come from unmanaged stress/anxiety. Self-coping strategies to manage symptoms of stress were reviewed and pharmacologic, psychotherapeutic, and complementary treatment options were discussed.    Patient Session Goals / Objectives:  ? Described the differences between stress and anxiety and how the body responds to it  ? Listed the long term effects and increased risks for chronic disease that can arise from unmanaged stress/anxiety  ? Identified and described  pharmacologic, psychotherapeutic, and complementary treatment options      Patient Participation / Response:  Fully participated with the group by sharing personal reflections / insights and openly received / provided feedback with other participants.    Demonstrated understanding of topics discussed through group discussion and participation, Identified / Expressed personal readiness to practice skills and Verbalized understanding of brain health topic    Treatment Plan:  Patient has an initial individualized treatment plan that was created as part of their diagnostic assessment / admission process.  A master individualized treatment plan is in the process of being developed with the patient and multi-disciplinary care team.    Bhavya Nash RN

## 2020-05-28 NOTE — GROUP NOTE
Psychoeducation Group Note    PATIENT'S NAME: Souleymane Zurita  MRN:   [id number]  :   1995  ACCT. NUMBER: [id number]  DATE OF SERVICE: 20  START TIME:  1:00 PM  END TIME:  1:50 PM  FACILITATOR: Henry Greene OTR/L  TOPIC: MH Life Skills Group: Sensory Approaches in Mental Health  Adult Dual Diagnosis Day Treatment  TRACK: 3    NUMBER OF PARTICIPANTS: 4  Telemedicine Visit: The patient's condition can be safely assessed and treated via synchronous audio and visual telemedicine encounter.      Reason for Telemedicine Visit: Services only offered telehealth    Originating Site (Patient Location): Patient's home    Distant Site (Provider Location): [facility]    Consent:  The patient/guardian has verbally consented to: the potential risks and benefits of telemedicine (video visit) versus in person care; bill my insurance or make self-payment for services provided; and responsibility for payment of non-covered services.     Mode of Communication:  Video Conference via [employer]    As the provider I attest to compliance with applicable laws and regulations related to telemedicine.      Summary of Group / Topics Discussed:  Sensory Approaches in Mental Health:  Sensory Enhanced Mindfulness: Patients were taught and provided with an opportunity to explore and practice how using sensory enhanced mindfulness practices can help them stay grounded in the present moment as a way to manage mental health symptoms and stressors.         Patient Session Goals / Objectives:    Identified how using sensory enhanced mindfulness practices can be used for grounding, stress management, and self regulation      Improved awareness of different types of sensory enhanced mindfulness activities that assist with healthy coping of stress and symptoms      Established a plan for practice of these skills in their own environments    Practiced and reflected on how to generalize taught skills to  their everyday life        Patient Participation / Response:  Fully participated with the group by sharing personal reflections / insights and openly received / provided feedback with other participants.    Patient presentation: engaged and noted types of movement and physical techniques that he prefers; open to learn and practice techniques, Verbalized understanding of content and Patient would benefit from additional opportunities to practice the content to be able to generalize it to their everyday life with increased intentionality, consistency, and efficacy in support of their psychiatric recovery    Treatment Plan:  Patient has an initial individualized treatment plan that was created as part of their diagnostic assessment / admission process.  A master individualized treatment plan is in the process of being developed with the patient and multi-disciplinary care team.    Henry Greene OTR/L

## 2020-05-28 NOTE — GROUP NOTE
Process Group Note    PATIENT'S NAME: Souleymane Zurita  MRN:   [id number]  :   1995  ACCT. NUMBER: [id number]  DATE OF SERVICE: 20  START TIME: 12:00 PM  END TIME: 12:50 PM  FACILITATOR: Shelby Vickers LMFT  TOPIC:  Process Group    Diagnoses:    296.33 (F33.2) Major Depressive Disorder, Recurrent Episode, Severe _  300.02 (F41.1) Generalized Anxiety Disorder  Substance-Related & Addictive Disorders Alcohol Use Disorder   303.90 (F10.20) Severe In early remission,   304.30 (F12.20) Cannabis Use Disorder Severe  .  4. R/O: Autism Spectrum Disorder 299.00(F84.0)  Associated with another neurodevelopmental, mental or behavioral disorder and Attention-Deficit/Hyperactivity Disorder  314.01 (F90.2) Combined presentation  300.01 (F41.0) Panic Disorder due to hx of panic attacks vs medication induced. Additionally, R/O spectrum disorder vs ADHD     Adult Dual Diagnosis Day Treatment  TRACK: 3    NUMBER OF PARTICIPANTS: 4    Telemedicine Visit: The patient's condition can be safely assessed and treated via synchronous audio and visual telemedicine encounter.      Reason for Telemedicine Visit: Services only offered telehealth    Originating Site (Patient Location): Patient's home    Distant Site (Provider Location): Provider Remote Setting    Consent:  The patient/guardian has verbally consented to: the potential risks and benefits of telemedicine (video visit) versus in person care; bill my insurance or make self-payment for services provided; and responsibility for payment of non-covered services.     Mode of Communication:  Video Conference via [employer]ure[employer]    As the provider I attest to compliance with applicable laws and regulations related to telemedicine.           Data:    Session content: At the start of this group, patients were invited to check in by identifying themselves, describing their current emotional status, and identifying issues to address in this group.   Area(s) of treatment focus  addressed in this session included Symptom Management, Personal Safety and Abstinence/Relapse Prevention.    Pedro reports feeling peaceful today, he is working on a goal of not using alcohol, he plans on using urge surfing, he is struggling with boardem today, denies safety si sib , reports no chemical use since 5/25/2020, is not taking rx currently, is grateful for his dog.  He would like listening.  Client declined additional process time but contributed to group discussion and provided feedback and support to peers.    He shared that he missed yesterday because he didn't know group was everyday,     Therapeutic Interventions/Treatment Strategies:  Psychotherapist offered support, feedback and validation and reinforced use of skills.    Assessment:    Patient response:   Patient responded to session by listening, being attentive and appearing alert    Possible barriers to participation / learning include: and no barriers identified    Health Issues:   None reported       Substance Use Review:   Substance Use: alcohol .  and Last use: 5/25/2020    Mental Status/Behavioral Observations  Appearance:   Appropriate   Eye Contact:   Poor  Psychomotor Behavior: Normal   Attitude:   Cooperative   Orientation:   All  Speech   Rate / Production: Normal/ Responsive Normal    Volume:  Normal   Mood:    Depressed   Affect:    Blunted  Flat   Thought Content:   Clear and Safety denies any current safety concerns including suicidal ideation, self-harm, and homicidal ideation  Thought Form:  Coherent  Logical     Insight:    Good     Plan:     Safety Plan: No current safety concerns identified.  Recommended that patient call 911 or go to the local ED should there be a change in any of these risk factors.     Barriers to treatment: None identified    Patient Contracts (see media tab):  None    Substance Use: Provided encouragement towards sobriety    Provided support and affirmation for steps taken towards sobriety      Continue or  Discharge: Patient will continue in Adult Dual Disorder Program (DDP) as planned. Patient is likely to benefit from learning and using skills as they work toward the goals identified in their treatment plan.      Sherri Vickers, LMFT  May 28, 2020

## 2020-05-29 ENCOUNTER — HOSPITAL ENCOUNTER (OUTPATIENT)
Dept: BEHAVIORAL HEALTH | Facility: CLINIC | Age: 25
End: 2020-05-29
Attending: PSYCHIATRY & NEUROLOGY
Payer: MEDICAID

## 2020-05-29 ENCOUNTER — TELEPHONE (OUTPATIENT)
Dept: BEHAVIORAL HEALTH | Facility: CLINIC | Age: 25
End: 2020-05-29

## 2020-05-29 PROCEDURE — G0177 OPPS/PHP; TRAIN & EDUC SERV: HCPCS | Mod: GT | Performed by: OCCUPATIONAL THERAPIST

## 2020-05-29 NOTE — TELEPHONE ENCOUNTER
Writer called patient to complete RN Physical Health Screening Questions.    Patient does not currently have a working phone.  Writer called mother (ESA signed on file) to attempt to speak with patient but patient was not available during the time of the call to speak.    Bhavya Nash RN on 5/29/2020 at 2:04 PM

## 2020-05-29 NOTE — GROUP NOTE
Psychoeducation Group Note    PATIENT'S NAME: Souleymane Zurita  MRN:   [id number]  :   1995  ACCT. NUMBER: [id number]  DATE OF SERVICE: 20  START TIME: 11:00 AM  END TIME: 11:50 AM  FACILITATOR: Henry Greene OTR/L  TOPIC: DYLAN RN Group: Health Maintenance  Adult Dual Diagnosis Day Treatment  TRACK: 3    NUMBER OF PARTICIPANTS: 3  Telemedicine Visit: The patient's condition can be safely assessed and treated via synchronous audio and visual telemedicine encounter.      Reason for Telemedicine Visit: Services only offered telehealth    Originating Site (Patient Location): Patient's home    Distant Site (Provider Location): [facility]: [facility]    Consent:  The patient/guardian has verbally consented to: the potential risks and benefits of telemedicine (video visit) versus in person care; bill my insurance or make self-payment for services provided; and responsibility for payment of non-covered services.     Mode of Communication:  Video Conference via [employer]    As the provider I attest to compliance with applicable laws and regulations related to telemedicine.      Summary of Group / Topics Discussed:  Health Maintenance: Weekend planning: Patients were given time to complete a weekend plan of what they will do to promote wellness and sobriety over the weekend when they do not have the structure of group. Patients were encouraged to review progress on their treatment goals and were challenged to identify ways to work toward meeting them. Patients identified and discussed foreseeable barriers to success over the weekend and then developed a plan to overcome them. Patients reviewed their distress coping skills and social support network and discussed this with the group.       Patient Session Goals / Objectives:    ?    Identified activities to engage in that promote balance in wellness  ?    Distinguished possible barriers to success over the weekend and created a plan to  overcome them  ?    Listed distress coping skills and identified social support network to utilize if in crisis during the weekend          Patient Participation / Response:  Fully participated with the group by sharing personal reflections / insights and openly received / provided feedback with other participants.    Demonstrated understanding of topics discussed through group discussion and participation, Identified / Expressed personal readiness to practice skills, Verbalized understanding of health maintenance topic and Further teaching needed, such as planning skills and follow through with balance of activities    Treatment Plan:  Patient has an initial individualized treatment plan that was created as part of their diagnostic assessment / admission process.  A master individualized treatment plan is in the process of being developed with the patient and multi-disciplinary care team.    Henry Greene OTR/L

## 2020-06-01 ENCOUNTER — TELEPHONE (OUTPATIENT)
Dept: BEHAVIORAL HEALTH | Facility: CLINIC | Age: 25
End: 2020-06-01

## 2020-06-01 NOTE — TELEPHONE ENCOUNTER
Phoned pts mother (since pts phone is not in service) and relayed info that group would start at 10 for pt.     She reports she has not been able to reach him the past weekend and was on her way to his home.

## 2020-06-02 ENCOUNTER — HOSPITAL ENCOUNTER (OUTPATIENT)
Dept: BEHAVIORAL HEALTH | Facility: CLINIC | Age: 25
End: 2020-06-02
Attending: PSYCHIATRY & NEUROLOGY
Payer: MEDICAID

## 2020-06-02 ENCOUNTER — TELEPHONE (OUTPATIENT)
Dept: BEHAVIORAL HEALTH | Facility: CLINIC | Age: 25
End: 2020-06-02

## 2020-06-02 PROCEDURE — G0177 OPPS/PHP; TRAIN & EDUC SERV: HCPCS | Mod: GT | Performed by: OCCUPATIONAL THERAPIST

## 2020-06-02 NOTE — GROUP NOTE
Psychoeducation Group Note    PATIENT'S NAME: Souleymane Zurita  MRN:   [id number]  :   1995  ACCT. NUMBER: [id number]  DATE OF SERVICE: 20  START TIME: 11:00 AM  END TIME: 11:50 AM  FACILITATOR: Henry Greene OTR/L  TOPIC: MH Life Skills Group: Lifestyle Balance and Structure  Adult Dual Diagnosis Day Treatment  TRACK: 3    NUMBER OF PARTICIPANTS: 3    Telemedicine Visit: The patient's condition can be safely assessed and treated via synchronous audio and visual telemedicine encounter.      Reason for Telemedicine Visit: Services only offered telehealth    Originating Site (Patient Location): Patient's home    Distant Site (Provider Location): [facility]: [facility]    Consent:  The patient/guardian has verbally consented to: the potential risks and benefits of telemedicine (video visit) versus in person care; bill my insurance or make self-payment for services provided; and responsibility for payment of non-covered services.     Mode of Communication:  Video Conference via [employer]    As the provider I attest to compliance with applicable laws and regulations related to telemedicine.      Summary of Group / Topics Discussed:  Lifestyle Balance and Strucure:  Goal-setting & integration: Patients were introduced to the process and benefits of setting realistic, timely, and achievable goals to help support their ability to follow through with meaningful personal, health, recovery and treatment goals that they would like to achieve to improve overall functioning.  Patients were also taught and then practiced techniques to manage a variety of obstacles to achieve their goals and how to break goals into manageable pieces.  Patients also reported on follow through of goals.     Patient Session Goals / Objectives:    Facilitated the creation of a vision for recovery and wellbeing    Identified and wrote meaningful SMART goals to engage in meaningful activities of daily living      Identified and problem solved barriers to achieving goals     Identified plan to support follow through on goals and reflection on progress made          Patient Participation / Response:  Fully participated with the group by sharing personal reflections / insights and openly received / provided feedback with other participants.    Patient presentation: engaged with prompts at times, but also shared on his own, noted his challenges with following though with most of his goals, open to listen to techniques to manage and suggestions; struggled to make goals specific and measureable, Verbalized understanding of content and Patient would benefit from additional opportunities to practice the content to be able to generalize it to their everyday life with increased intentionality, consistency, and efficacy in support of their psychiatric recovery    Treatment Plan:  Patient has a current master individualized treatment plan.  See Epic treatment plan for more information.    Henry Greene OTR/L

## 2020-06-03 ENCOUNTER — HOSPITAL ENCOUNTER (OUTPATIENT)
Dept: BEHAVIORAL HEALTH | Facility: CLINIC | Age: 25
End: 2020-06-03
Attending: PSYCHIATRY & NEUROLOGY
Payer: MEDICAID

## 2020-06-03 PROCEDURE — 90853 GROUP PSYCHOTHERAPY: CPT | Mod: GT | Performed by: PSYCHOLOGIST

## 2020-06-03 PROCEDURE — G0177 OPPS/PHP; TRAIN & EDUC SERV: HCPCS | Mod: GT

## 2020-06-03 NOTE — PROGRESS NOTES
Adult Dual Disorder Program:  Individualized Treatment Plan     Date of Plan: 6/3/2020    Name: Souleymane Zurita MRN: [id number]    : 1995    Programs:  Adult Dual Disorder Program (DDP)    Clinical Track (if applicable):  11A    DSM5 Diagnosis  296.33 (F33.2) Major Depressive Disorder, Recurrent Episode, Severe _  300.02 (F41.1) Generalized Anxiety Disorder   303.90 (F10.20) Alcohol Use Disorder, Severe      Adult Dual Disorder Program Multidisciplinary Team Members: Elvia Ramirez MD, Victor Manuel Ellis MD; Diane Bush, [facility], [facility] [place][note 2]; Michael Galeas MA, , [facility] [place][note 2]; Sherri Vickers LMFT; Delia Greene OTR/L; Bhavya Brandt RN, PHN    Souleymane Zurita will participate in the Adult Dual Disorder Program  5 days per week, 3 hours per day. Anticipated duration/discharge: 6-8 weeks Due to COVID-19, the type of service modality, frequency, and duration of treatment is altered. Services will be delivered via telemedicine and/or telephone calls until able to resume in-person group modality.        Program Start Date: 20  Anticipated Discharge Date: 20 (pending authorization/clinical changes)    NOTE: Complete CGI     Client Strengths:good at fixing things, insight    Client Participation in Plan:  Contributed to goals and plan   Agrees with plan   Discussed with staff     Areas of Vulnerability:  Suicidal Ideation   Anxiety  Depressive symptoms     Long-Term Goals:  Knowledge about illness and management of symptoms   Maintenance of personal safety   Maintenance of sobriety     Abuse Prevention Plan:  Safe, therapeutic environment   Safety coping plan as needed   Education regarding illness and skill development   Coordination with care providers   Impluse control education and intervention   Monitor for use of substances    Discharge Criteria:  Satisfactory progress toward treatment goals   Improvement re: identified problems and symptoms   Ability to continue recovery at next level of service   Has a  discharge plan in place   Has safety/coping plan in place   Ability to maintain sobriety  Share autobiography   Complete goodbye letter assignment   Complete coping cards   Regular attendance as scheduled     Areas of Treatment Focus        Area of Treatment Focus:   Personal Safety  Start Date:    6/3/2020    Dimension:   III. Emotional / Behavioral Condition    Description:    Pt has a hx of S/I. He reports last S/I was last week. Pt is agreeable to following safety plan.     Goal:  Target Date: 6/30/20 Status: Stopped  Client will use coping plan for safety, as needed.      Progress:   6/25/20 pt discharged, goal stopped    Treatment Strategies:   Assess / reassess level of potential for harm to self or others  Engage in safety planning when indicated  Teach adaptive coping skills and communication skills        Area of Treatment Focus:   Symptom Stabilization and Management  Start Date:    6/2/20    Dimension:   III. Emotional / Behavioral Condition    Description:    Pt reports anxiety and depression. Pt reports he is depressed most of the time. He reports low motivation, sleeps excessively, and has anxiety attacks.     Goal:  Target Date: 6/30/20 Status: Stopped  Will Improve Mindfulness / Stay in the Here and Now Regularly practice intentionally focusing on what is occurring in the environment, what you are sensing, thoughts that are popping into your head, emotions that you are feeling, without judging any of it, just noticing it.  Regular practice is ideally stopping to do this on schedule, 4 times a day for four minutes each time.      Progress:   6/25/20 GOAL STOPPED    Treatment Strategies:   Assist to identify treatment goals  Assess / reassess for appropriate therapy program involvement, encourage participation in therapies  Facilitate increased self awareness  Teach adaptive coping skills and communication skills        Area of Treatment Focus:   Abstinence / Relapse Prevention  Start Date:     "6/2/2020    Dimension:   V. Relapse    Description:    Pt reports he had problems with alcohol.     Goal:  Target Date: 6/30/20 Status: Stopped  Pt will develop additional relapse prevention skills by target date.       Progress:   6/25/20 GOAL STOPPED    Treatment Strategies:   Assist clients in establishing / strengthening support network  Assist to identify treatment goals  Facilitate increased self awareness  Utilize and reinforce no-use contract for substance use / abuse        Area of Treatment Focus:   Community Resources / Support and Discharge Planning  Start Date:    6/2/20    Dimension:   VI. Recovery Environment    Description:    Pt reports he does not have a therapist. He will obtain one by target date. Pt reports he plans to attend AA.     Goal:  Target Date: 6/30/20 Status: STOPPED  Pt will obtain a therapist by target date. He will attend online meetings weekly.       Progress:   6/25/20 GOAL STOPPED    Treatment Strategies:   Assist clients in establishing / strengthening support network  Assist with discharge planning         Miguel Angel Galeas LP      NOTE: Required signatures are completed manually and scanned into the electronic medical record. See \"Media\" tab in epic.    The Individualized Treatment Plan Signature Page has been routed to the provider for co-sign.             "

## 2020-06-03 NOTE — PROGRESS NOTES
Adult Dual Disorder Program:   Acknowledgement of Current Treatment Plan       I have reviewed my treatment plan with my therapist on 6/3/2020.   I agree with the plan as it is written in the electronic health record.    Name:      Signature:  Souleymane Zurita     Pt unable to sign due to COVID 19         Michael Galeas MA, LP  Psychotherapist    Elvia Ramirez MD  Psychiatrist/Medical Director Elvia Ramirez MD

## 2020-06-03 NOTE — GROUP NOTE
"Process Group Note    PATIENT'S NAME: Souleymane Zurita  MRN:   [id number]  :   1995  ACCT. NUMBER: [id number]  DATE OF SERVICE: 20  START TIME: 12:00 PM  END TIME: 12:50 PM  FACILITATOR: Michelle Cadet; Miguel Angel Galeas LP  TOPIC:  Process Group    Diagnoses:296.33 (F33.2) Major Depressive Disorder, Recurrent Episode, Severe _  300.02 (F41.1) Generalized Anxiety Disorder   303.90 (F10.20) Alcohol Use Disorder, Severe    Adult Dual Diagnosis Day Treatment  TRACK: 3    NUMBER OF PARTICIPANTS: 2    Telemedicine Visit: The patient's condition can be safely assessed and treated via synchronous audio and visual telemedicine encounter.      Reason for Telemedicine Visit: Services only offered telehealth    Originating Site (Patient Location): Patient's home    Distant Site (Provider Location): [facility]    Consent:  The patient/guardian has verbally consented to: the potential risks and benefits of telemedicine (video visit) versus in person care; bill my insurance or make self-payment for services provided; and responsibility for payment of non-covered services.     Mode of Communication:  Video Conference via [employer]    As the provider I attest to compliance with applicable laws and regulations related to telemedicine.         Data:    Session content: At the start of this group, patients were invited to check in by identifying themselves, describing their current emotional status, and identifying issues to address in this group.   Area(s) of treatment focus addressed in this session included Symptom Management, Abstinence/Relapse Prevention and Physical Health .  Pt reported feeling sleepy, but slept well (10+ hours), and a \"little crabby\" from recently waking up.  Pt identified a goal of eating well, which he has made steps towards by going out to buy healthier food, and identified a potential barrier of low motivation. Pt shared that he was proud of having " a clean house, noting that the state of his house is a good indicator of his inner state. Pt plans get outside today and take his dog for a walk around his property.  Pt reported no safety concerns, no SI or SIB, no recent substance use, and is taking medications as prescribed.      Therapeutic Interventions/Treatment Strategies:  Psychotherapist offered support, feedback and validation and reinforced use of skills. Treatment modalities used include Motivational Interviewing, Cognitive Behavioral Therapy and Dialectical Behavioral Therapy. Interventions include Behavioral Activation: Reinforced benefits/challenges of change process through applying skills to replace unwanted behaviors, Explored how behaviors effect mood and interact with thoughts and feelings and Encouraged strategies to reduce individual procrastination and increase motivation by increasing goal-directed activities to enhance mood and reduce symptoms. and Relapse Prevention: Facilitated understanding the importance of awareness of factors that contribute to relapse  and Coached on skills to manage factors that contribute to relapse.    Assessment:    Patient response:   Patient responded to session by accepting feedback, giving feedback, listening, focusing on goals, being attentive and accepting support    Possible barriers to participation / learning include: and no barriers identified    Health Issues:   None reported       Substance Use Review:   Substance Use: alcohol .     Mental Status/Behavioral Observations  Appearance:   Appropriate   Eye Contact:   Good   Psychomotor Behavior: Normal   Attitude:   Cooperative   Orientation:   All  Speech   Rate / Production: Normal    Volume:  Normal   Mood:    Normal  Affect:    Appropriate   Thought Content:   Clear  Thought Form:  Coherent  Logical     Insight:    Good     Plan:     Safety Plan: Recommended that patient call 911 or go to the local ED should there be a change in any of these risk  factors.     Barriers to treatment: None identified    Patient Contracts (see media tab):  None    Substance Use: Stage of Change: Preparatory and Action     Continue or Discharge: Patient will continue in Adult Dual Disorder Program (DDP) as planned. Patient is likely to benefit from learning and using skills as they work toward the goals identified in their treatment plan.      Michelle Cadet  Mónica 3, 2020

## 2020-06-03 NOTE — GROUP NOTE
Psychoeducation Group Note    PATIENT'S NAME: Souleymane Zurita  MRN:   [id number]  :   1995  ACCT. NUMBER: [id number]  DATE OF SERVICE: 20  START TIME: 11:00 AM  END TIME: 11:50 AM  FACILITATOR: Kian Mendez OTR/L  TOPIC: DYLAN RN Group: [facility]  Telemedicine Visit: The patient's condition can be safely assessed and treated via synchronous audio and visual telemedicine encounter.      Reason for Telemedicine Visit: COVID-19    Originating Site (Patient Location): Patient's home    Distant Site (Provider Location): [facility]: [facility]    Consent:  The patient/guardian has verbally consented to: the potential risks and benefits of telemedicine (video visit) versus in person care; bill my insurance or make self-payment for services provided; and responsibility for payment of non-covered services.     Mode of Communication:  Video Conference via [employer]    As the provider I attest to compliance with applicable laws and regulations related to telemedicine.   Adult Mental Health Day Treatment  TRACK: DDP Group 3    NUMBER OF PARTICIPANTS: 4    Summary of Group / Topics Discussed:  Foundations of Health: Stress Resistance Resources(Sleep/Exercise/Nutrition): Patients were provided education on the role of nutrition in the body and all of the functions that nutrition influences including energy, body structure and bodily function as overarching categories. Select macronutrients and micronutrients and their important roles and functions were also reviewed. Chemical and substance use disrupt how we eat, how our organs behave, and what our body does with the food that we do eat. Patients were educated on the effects that chemicals have on nutritional status and ways in which nutrition can be promoted while in recovery.      Patient Session Goals / Objectives:  ? Identified hunger as a factor that can increase risk for chemical/substance relapse  ? Described the role of  macronutrients and micronutrients in the body  ? Explained the effects of chemicals/substances on nutrition  ? Listed strategies for promoting balanced nutrition to promote wellness and recovery  ? Also suggested strategies to improve sleep quality and the benefits of exercise to promote wellness and recovery      Patient Participation / Response:  Fully participated with the group by sharing personal reflections / insights and openly received / provided feedback with other participants.    Patient Presentation: Calm,alert,focused with stable mood and thought process.    Treatment Plan:  Patient has a current master individualized treatment plan.  See Epic treatment plan for more information.    Kian Mendez, OTR/L

## 2020-06-03 NOTE — GROUP NOTE
Psychotherapy Group Note    PATIENT'S NAME: Souleymane Zurita  MRN:   [id number]  :   1995  ACCT. NUMBER: [id number]  DATE OF SERVICE: 20  START TIME:  1:00 PM  END TIME:  1:50 PM  FACILITATOR: Olivia Cadet Jeffrey Allen Carlson, LP  TOPIC:  EBP Group: Emotions Management  Adult Dual Diagnosis Day Treatment  TRACK: 3    NUMBER OF PARTICIPANTS: 4    Telemedicine Visit: The patient's condition can be safely assessed and treated via synchronous audio and visual telemedicine encounter.      Reason for Telemedicine Visit: Services only offered telehealth    Originating Site (Patient Location): Patient's home    Distant Site (Provider Location): [facility]    Consent:  The patient/guardian has verbally consented to: the potential risks and benefits of telemedicine (video visit) versus in person care; bill my insurance or make self-payment for services provided; and responsibility for payment of non-covered services.     Mode of Communication:  Video Conference via [facility]    As the provider I attest to compliance with applicable laws and regulations related to telemedicine.     Summary of Group / Topics Discussed:  Emotions Management: Model of Emotions: Patients were introduced to the cyclical model of emotions.  Explored emotions are shaped by different events and one s interpretation of events.  Group discussed how emotions begin with an event, followed by one s interpretation, followed by associated feelings.  Discussion included a review of personal urges and actions that can/do follow an emotional experience in the patient s life, and the end results.    Patient Session Goals / Objectives:    Demonstrate understanding of types various emotions.    Identify and discuss specific emotions and when they occur; understand triggers.    Identify individual emotions and physical sensations that accompany them.    Discuss urges and actions, and how to influence the  intensity of emotional reactions and disrupt the cycle.      Discuss barriers to emotional regulation.    Choose 1-2 strategies to assist with emotional response to potentially distressing situations.      Patient Participation / Response:  Fully participated with the group by sharing personal reflections / insights and openly received / provided feedback with other participants.    Demonstrated understanding of topics discussed through group discussion and participation, Expressed understanding of the relevance / importance of emotions management skills at distressing times in life, Self-aware of experiences with difficult emotions, and strategies to employ to manage them, Demonstrated knowledge of when to consider applying a variety of emotions management skills in daily life and Demonstrated understanding and practice strategies to manage difficult emotions and move towards healing    Treatment Plan:  Patient has an initial individualized treatment plan that was created as part of their diagnostic assessment / admission process.  A master individualized treatment plan is in the process of being developed with the patient and multi-disciplinary care team.    Michelle Cadet

## 2020-06-03 NOTE — GROUP NOTE
Process Group Note    PATIENT'S NAME: Souleymane Zurita  MRN:   [id number]  :   1995  ACCT. 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  4. R/O: Autism Spectrum Disorder 299.00(F84.0)  Associated with another neurodevelopmental, mental or behavioral disorder and Attention-Deficit/Hyperactivity Disorder  314.01 (F90.2) Combined presentation  300.01 (F41.0) Panic Disorder due to hx of panic attacks vs medication induced. Additionally, R/O spectrum disorder vs ADHD     Adult Dual Diagnosis Day Treatment  TRACK: 3    NUMBER OF PARTICIPANTS: 4    Telemedicine Visit: The patient's condition can be safely assessed and treated via synchronous audio and visual telemedicine encounter.      Reason for Telemedicine Visit: Services only offered telehealth    Originating Site (Patient Location): Patient's home    Distant Site (Provider Location): [facility]    Consent:  The patient/guardian has verbally consented to: the potential risks and benefits of telemedicine (video visit) versus in person care; bill my insurance or make self-payment for services provided; and responsibility for payment of non-covered services.     Mode of Communication:  Video Conference via [employer]    As the provider I attest to compliance with applicable laws and regulations related to telemedicine.           Data:    Session content: At the start of this group, patients were invited to check in by identifying themselves, describing their current emotional status, and identifying issues to address in  this group.   Area(s) of treatment focus addressed in this session included Symptom Management, Personal Safety, Community Resources/Discharge Planning, Abstinence/Relapse Prevention, Develop / Improve Independent Living Skills and Develop Socialization / Interpersonal Relationship Skills.      Therapeutic Interventions/Treatment Strategies:  Psychotherapist offered support, feedback and validation. Treatment modalities used include Cognitive Behavioral Therapy. Interventions include Coping Skills: Assisted patient in identifying 1-2 healthy distraction skills to reduce overall distress.    Assessment:    Patient response:   Patient responded to session by accepting feedback, giving feedback, listening, focusing on goals and being attentive    Possible barriers to participation / learning include: and no barriers identified    Health Issues:   None reported       Substance Use Review:   Substance Use: alcohol  and cannabis .     Mental Status/Behavioral Observations  Appearance:   Appropriate   Eye Contact:   Good   Psychomotor Behavior: Normal   Attitude:   Cooperative   Orientation:   All  Speech   Rate / Production: Normal    Volume:  Normal   Mood:    Irritable   Affect:    Appropriate   Thought Content:   Clear  Thought Form:  Coherent  Logical     Insight:    Good     Plan:     Safety Plan: Recommended that patient call 911 or go to the local ED should there be a change in any of these risk factors.     Barriers to treatment: None identified    Patient Contracts (see media tab):  None    Substance Use: Stage of Change: Action     Continue or Discharge: Patient will continue in Adult Dual Disorder Program (DDP) as planned. Patient is likely to benefit from learning and using skills as they work toward the goals identified in their treatment plan.      Miguel Angel Galeas LP  Mónica 3, 2020

## 2020-06-04 ENCOUNTER — HOSPITAL ENCOUNTER (OUTPATIENT)
Dept: BEHAVIORAL HEALTH | Facility: CLINIC | Age: 25
End: 2020-06-04
Attending: PSYCHIATRY & NEUROLOGY
Payer: MEDICAID

## 2020-06-04 PROCEDURE — 90853 GROUP PSYCHOTHERAPY: CPT | Mod: GT | Performed by: PSYCHOLOGIST

## 2020-06-04 PROCEDURE — G0177 OPPS/PHP; TRAIN & EDUC SERV: HCPCS | Mod: GT | Performed by: OCCUPATIONAL THERAPIST

## 2020-06-04 PROCEDURE — G0177 OPPS/PHP; TRAIN & EDUC SERV: HCPCS | Mod: GT

## 2020-06-04 NOTE — GROUP NOTE
Process Group Note    PATIENT'S NAME: Souleymane Zurita  MRN:   [id number]  :   1995  ACCT. NUMBER: [id number]  DATE OF SERVICE: 20  START TIME: 12:00 PM  END TIME: 12:50 PM  FACILITATOR: Miguel Angel Galeas LP  TOPIC:  Process Group    Diagnoses:  296.33 (F33.2) Major Depressive Disorder, Recurrent Episode, Severe _  300.02 (F41.1) Generalized Anxiety Disorder   303.90 (F10.20) Alcohol Use Disorder, Severe       Adult Dual Diagnosis Day Treatment  TRACK: 3    NUMBER OF PARTICIPANTS: 5    Telemedicine Visit: The patient's condition can be safely assessed and treated via synchronous audio and visual telemedicine encounter.      Reason for Telemedicine Visit: Services only offered telehealth    Originating Site (Patient Location): Patient's home    Distant Site (Provider Location): [facility]    Consent:  The patient/guardian has verbally consented to: the potential risks and benefits of telemedicine (video visit) versus in person care; bill my insurance or make self-payment for services provided; and responsibility for payment of non-covered services.     Mode of Communication:  Video Conference via [employer]    As the provider I attest to compliance with applicable laws and regulations related to telemedicine.            Data:    Session content: At the start of this group, patients were invited to check in by identifying themselves, describing their current emotional status, and identifying issues to address in this group.   Area(s) of treatment focus addressed in this session included Symptom Management, Personal Safety, Community Resources/Discharge Planning, Abstinence/Relapse Prevention, Develop / Improve Independent Living Skills and Develop Socialization / Interpersonal Relationship Skills.    Pt reports he is pretty good, no safety issues, no use. Pt quiet during group.     Therapeutic Interventions/Treatment Strategies:  Psychotherapist offered support,  feedback and validation.    Assessment:    Patient response:   Patient responded to session by listening and appearing disengaged    Possible barriers to participation / learning include: and no barriers identified    Health Issues:   None reported       Substance Use Review:   Substance Use: alcohol .     Mental Status/Behavioral Observations  Appearance:   Appropriate   Eye Contact:   Good   Psychomotor Behavior: Normal   Attitude:   Cooperative   Orientation:   All  Speech   Rate / Production: Normal    Volume:  Normal   Mood:    Depressed   Affect:    Appropriate   Thought Content:   Clear  Thought Form:  Coherent  Logical     Insight:    Good     Plan:     Safety Plan: Recommended that patient call 911 or go to the local ED should there be a change in any of these risk factors.     Barriers to treatment: None identified    Patient Contracts (see media tab):  None    Substance Use: Stage of Change: Action     Continue or Discharge: Patient will continue in Adult Dual Disorder Program (DDP) as planned. Patient is likely to benefit from learning and using skills as they work toward the goals identified in their treatment plan.      Miguel Angel Galeas LP  June 4, 2020

## 2020-06-04 NOTE — GROUP NOTE
Psychoeducation Group Note    PATIENT'S NAME: Souleymane Zurita  MRN:   [id number]  :   1995  ACCT. NUMBER: [id number]  DATE OF SERVICE: 20  START TIME:  1:00 PM  END TIME:  1:50 PM  FACILITATOR: Henry Greene OTR/L  TOPIC: MH Life Skills Group: Communication and Social Skills Development  Adult Dual Diagnosis Day Treatment  TRACK: 3    NUMBER OF PARTICIPANTS: 4    Telemedicine Visit: The patient's condition can be safely assessed and treated via synchronous audio and visual telemedicine encounter.      Reason for Telemedicine Visit: Services only offered telehealth    Originating Site (Patient Location): Patient's home    Distant Site (Provider Location): [facility]: [facility]    Consent:  The patient/guardian has verbally consented to: the potential risks and benefits of telemedicine (video visit) versus in person care; bill my insurance or make self-payment for services provided; and responsibility for payment of non-covered services.     Mode of Communication:  Video Conference via [employer]    As the provider I attest to compliance with applicable laws and regulations related to telemedicine.      Summary of Group / Topics Discussed:  Communication and Social Skills Development: Social Supports: Making Connections: Patients were taught about the importance of connecting with other people.  Patients gained awareness of their personal strengths and opportunities for growth in communicating with others.  Patients were taught skills and given an opportunity to practice ways to improve their ability to connect with others.       Patient Session Goals / Objectives:    Identified strengths and opportunities for growth in connecting with other people and how this   impacts their ability to communicate clearly with other people       Improved awareness of important aspects of the need for connection with other people and how this relates to mental health recovery        Established a  plan for practice of these skills in their own environments    Practiced and reflected on how to generalize taught skills to their everyday life      Patient Participation / Response:  Moderately participated, sharing some personal reflections / insights and adequately adequately received / provided feedback with other participants.    Patient presentation: needed some prompts to share at times and was quiet, needing some prompts to speak up into microphone, also; did share some interests and values, Verbalized understanding of content and Patient would benefit from additional opportunities to practice the content to be able to generalize it to their everyday life with increased intentionality, consistency, and efficacy in support of their psychiatric recovery    Treatment Plan:  Patient has a current master individualized treatment plan.  See Epic treatment plan for more information.    Henry Greene, OTR/L

## 2020-06-04 NOTE — GROUP NOTE
Psychoeducation Group Note    PATIENT'S NAME: Souleymane Zurita  MRN:   [id number]  :   1995  ACCT. NUMBER: [id number]  DATE OF SERVICE: 20  START TIME: 11:00 AM  END TIME: 11:50 AM  FACILITATOR: Kian Mendez OTR/L  TOPIC: DYLAN RN Group: Health Maintenance  Telemedicine Visit: The patient's condition can be safely assessed and treated via synchronous audio and visual telemedicine encounter.      Reason for Telemedicine Visit:  COVID-19    Originating Site (Patient Location): Patient's home    Distant Site (Provider Location): [facility]: [facility]    Consent:  The patient/guardian has verbally consented to: the potential risks and benefits of telemedicine (video visit) versus in person care; bill my insurance or make self-payment for services provided; and responsibility for payment of non-covered services.     Mode of Communication:  Video Conference via [email or web address]    As the provider I attest to compliance with applicable laws and regulations related to telemedicine.   Adult Mental Health Day Treatment  TRACK: DDP Group 3    NUMBER OF PARTICIPANTS: 5    Summary of Group / Topics Discussed:  Health Maintenance: Weekend planning: Patients were given time to complete a weekend plan of what they will do to promote wellness and sobriety over the weekend when they do not have the structure of group. Patients were encouraged to review progress on their treatment goals and were challenged to identify ways to work toward meeting them. Patients identified and discussed foreseeable barriers to success over the weekend and then developed a plan to overcome them. Patients reviewed their distress coping skills and social support network and discussed this with the group.       Patient Session Goals / Objectives:    ?    Identified activities to engage in that promote balance in wellness  ?    Distinguished possible barriers to success over the weekend and created a plan to overcome them  ?     Listed distress coping skills and identified social support network to utilize if in crisis during the weekend          Patient Participation / Response:  Fully participated with the group by sharing personal reflections / insights and openly received / provided feedback with other participants.    Patient Presentation: Calm,alert,focused with stable mood and thought process.    Treatment Plan:  Patient has a current master individualized treatment plan.  See Epic treatment plan for more information.    Kian Mendez, OTR/L

## 2020-06-05 ENCOUNTER — HOSPITAL ENCOUNTER (OUTPATIENT)
Dept: BEHAVIORAL HEALTH | Facility: CLINIC | Age: 25
End: 2020-06-05
Attending: PSYCHIATRY & NEUROLOGY
Payer: MEDICAID

## 2020-06-05 PROCEDURE — 90853 GROUP PSYCHOTHERAPY: CPT | Mod: GT | Performed by: PSYCHOLOGIST

## 2020-06-05 PROCEDURE — G0177 OPPS/PHP; TRAIN & EDUC SERV: HCPCS | Mod: GT | Performed by: OCCUPATIONAL THERAPIST

## 2020-06-05 NOTE — GROUP NOTE
Psychotherapy Group Note    PATIENT'S NAME: Souleymane Zurita  MRN:   [id number]  :   1995  ACCT. NUMBER: [id number]  DATE OF SERVICE: 20  START TIME: 12:00 PM  END TIME: 12:50 PM  FACILITATOR: Miguel Angel Galeas LP  TOPIC:  EBP Group: Emotions Management  Adult Dual Diagnosis Day Treatment  TRACK: 3    NUMBER OF PARTICIPANTS: 7    Telemedicine Visit: The patient's condition can be safely assessed and treated via synchronous audio and visual telemedicine encounter.      Reason for Telemedicine Visit: Services only offered telehealth    Originating Site (Patient Location): Patient's home    Distant Site (Provider Location): [facility]    Consent:  The patient/guardian has verbally consented to: the potential risks and benefits of telemedicine (video visit) versus in person care; bill my insurance or make self-payment for services provided; and responsibility for payment of non-covered services.     Mode of Communication:  Video Conference via [employer]    As the provider I attest to compliance with applicable laws and regulations related to telemedicine.      Summary of Group / Topics Discussed:  Emotions Management: Anger: Patients explored and shared personal experiences associated with feelings of anger.  Group explored how these feelings develop, what they mean to each individual, and how to increase acceptance and usefulness of these feelings.  Discussed anger as a  secondary  emotion and reviewed ways to manage anger and challenge associated cognitive distortions. Group members worked to contextualize these concepts and promote healing.     Patient Session Goals / Objectives:    Discuss and review definitions and personal views/experiences with anger    Explore how feelings of anger impact functioning    Understand and practice strategies to manage difficult emotions and move towards healing    Demonstrate understanding of the feelings of anger    Verbalize  how these emotions have impacted their lives/functioning    Verbalize of knowledge gained and possible interventions to manage feelings      Patient Participation / Response:  Fully participated with the group by sharing personal reflections / insights and openly received / provided feedback with other participants.    Demonstrated understanding of topics discussed through group discussion and participation    Treatment Plan:  Patient has an initial individualized treatment plan that was created as part of their diagnostic assessment / admission process.  A master individualized treatment plan is in the process of being developed with the patient and multi-disciplinary care team.    Miguel Angel Galeas, LP

## 2020-06-05 NOTE — GROUP NOTE
Psychoeducation Group Note    PATIENT'S NAME: Souleymane Zurita  MRN:   [id number]  :   1995  ACCT. NUMBER: [id number]  DATE OF SERVICE: 20  START TIME:  1:00 PM  END TIME:  1:50 PM  FACILITATOR: Henry Greene OTR/L  TOPIC: MH Life Skills Group: Resiliency Development  Adult Dual Diagnosis Day Treatment  TRACK: 3    NUMBER OF PARTICIPANTS: 6    Telemedicine Visit: The patient's condition can be safely assessed and treated via synchronous audio and visual telemedicine encounter.      Reason for Telemedicine Visit: Services only offered telehealth    Originating Site (Patient Location): Patient's home    Distant Site (Provider Location): [facility]: [facility]    Consent:  The patient/guardian has verbally consented to: the potential risks and benefits of telemedicine (video visit) versus in person care; bill my insurance or make self-payment for services provided; and responsibility for payment of non-covered services.     Mode of Communication:  Video Conference via [employer]    As the provider I attest to compliance with applicable laws and regulations related to telemedicine.    Summary of Group / Topics Discussed:  Resiliency Development:  Coping Skills: Patients were taught how to identify stressors, signs of stress, coping skills, and prevention strategies for overall stress management.  Patients were given the opportunity to identify both ongoing and acute mental health symptoms and how to effectively manage these symptoms by developing an effective aftercare plan.  Patients increased awareness of community based resources.    Patient Session Goals / Objectives:    Identified how using coping skills can be used for symptom and stress management       Improved awareness of individualed symptoms and stressors and how to effectively cope     Established a relapse prevention plan to practice these skills in their own environments    Practiced and reflected on how to generalize  taught skills to their everyday life          Patient Participation / Response:  Moderately participated, sharing some personal reflections / insights and adequately adequately received / provided feedback with other participants.    Patient presentation: slightly more engaged yet still hard to hear; shared briefly about changing pace and some techniques that might be helpful for him, Verbalized understanding of content and Patient would benefit from additional opportunities to practice the content to be able to generalize it to their everyday life with increased intentionality, consistency, and efficacy in support of their psychiatric recovery    Treatment Plan:  Patient has a current master individualized treatment plan.  See Epic treatment plan for more information.    Henry Greene, OTR/L

## 2020-06-05 NOTE — GROUP NOTE
"Process Group Note    PATIENT'S NAME: Souleymane Zurita  MRN:   [id number]  :   1995  ACCT. NUMBER: [id number]  DATE OF SERVICE: 20  START TIME: 11:00 AM  END TIME: 11:50 AM  FACILITATOR: Miguel Angel Galeas LP  TOPIC:  Process Group    Diagnoses:  296.33 (F33.2) Major Depressive Disorder, Recurrent Episode, Severe _  300.02 (F41.1) Generalized Anxiety Disorder   303.90 (F10.20) Alcohol Use Disorder, Severe    Adult Dual Diagnosis Day Treatment  TRACK: 3    NUMBER OF PARTICIPANTS: 7    Telemedicine Visit: The patient's condition can be safely assessed and treated via synchronous audio and visual telemedicine encounter.      Reason for Telemedicine Visit: Services only offered telehealth    Originating Site (Patient Location): Patient's home    Distant Site (Provider Location): [facility]    Consent:  The patient/guardian has verbally consented to: the potential risks and benefits of telemedicine (video visit) versus in person care; bill my insurance or make self-payment for services provided; and responsibility for payment of non-covered services.     Mode of Communication:  Video Conference via Yidio    As the provider I attest to compliance with applicable laws and regulations related to telemedicine.            Data:    Session content: At the start of this group, patients were invited to check in by identifying themselves, describing their current emotional status, and identifying issues to address in this group.   Area(s) of treatment focus addressed in this session included Symptom Management, Personal Safety, Community Resources/Discharge Planning, Abstinence/Relapse Prevention, Develop / Improve Independent Living Skills and Develop Socialization / Interpersonal Relationship Skills.    Pt reports feeling \"crappy,\" anxious, and had a panic attack earlier today. Pt reports a goal of \"not trying to be stressed.\" Pt reports he would do better if he were to " get off the Internet. Pt reports he was able to get his yard mowed and cleaned up.     Therapeutic Interventions/Treatment Strategies:  Psychotherapist offered support, feedback and validation.    Assessment:    Patient response:   Patient responded to session by accepting feedback, listening, accepting support and appearing disengaged    Possible barriers to participation / learning include: and no barriers identified    Health Issues:   None reported       Substance Use Review:   Substance Use: alcohol .     Mental Status/Behavioral Observations  Appearance:   Appropriate   Eye Contact:   Good   Psychomotor Behavior: Normal   Attitude:   Cooperative   Orientation:   All  Speech   Rate / Production: Normal    Volume:  Normal   Mood:    Anxious  Normal  Affect:    Constricted   Thought Content:   Clear and Safety denies any current safety concerns including suicidal ideation, self-harm, and homicidal ideation  Thought Form:  Coherent  Logical     Insight:    Fair     Plan:     Safety Plan: Recommended that patient call 911 or go to the local ED should there be a change in any of these risk factors.     Barriers to treatment: None identified    Patient Contracts (see media tab):  None    Substance Use: Stage of Change: Action     Continue or Discharge: Patient will continue in Adult Dual Disorder Program (DDP) as planned. Patient is likely to benefit from learning and using skills as they work toward the goals identified in their treatment plan.      Miguel Angel Galeas, SALOMON  June 5, 2020

## 2020-06-08 ENCOUNTER — HOSPITAL ENCOUNTER (OUTPATIENT)
Dept: BEHAVIORAL HEALTH | Facility: CLINIC | Age: 25
End: 2020-06-08
Attending: PSYCHIATRY & NEUROLOGY
Payer: MEDICAID

## 2020-06-08 PROCEDURE — 90853 GROUP PSYCHOTHERAPY: CPT | Mod: GT | Performed by: PSYCHOLOGIST

## 2020-06-08 PROCEDURE — G0177 OPPS/PHP; TRAIN & EDUC SERV: HCPCS | Mod: GT | Performed by: OCCUPATIONAL THERAPIST

## 2020-06-08 PROCEDURE — G0177 OPPS/PHP; TRAIN & EDUC SERV: HCPCS | Mod: GT

## 2020-06-08 NOTE — GROUP NOTE
Process Group Note    PATIENT'S NAME: Souleymane Zurita  MRN:   [id number]  :   1995  ACCT. NUMBER: [id number]  DATE OF SERVICE: 20  START TIME:  1:00 PM  END TIME:  1:50 PM  FACILITATOR: Michelle Cadet; Miguel Angel Galeas LP  TOPIC:  Process Group    Diagnoses:  296.33 (F33.2) Major Depressive Disorder, Recurrent Episode, Severe _  300.02 (F41.1) Generalized Anxiety Disorder   303.90 (F10.20) Alcohol Use Disorder, Severe    Adult Dual Diagnosis Day Treatment  TRACK: 3    NUMBER OF PARTICIPANTS: 7    Telemedicine Visit: The patient's condition can be safely assessed and treated via synchronous audio and visual telemedicine encounter.      Reason for Telemedicine Visit: Services only offered telehealth    Originating Site (Patient Location): Patient's home    Distant Site (Provider Location): [facility]    Consent:  The patient/guardian has verbally consented to: the potential risks and benefits of telemedicine (video visit) versus in person care; bill my insurance or make self-payment for services provided; and responsibility for payment of non-covered services.     Mode of Communication:  Video Conference via Om[employer]    As the provider I attest to compliance with applicable laws and regulations related to telemedicine.           Data:    Session content: At the start of this group, patients were invited to check in by identifying themselves, describing their current emotional status, and identifying issues to address in this group.   Area(s) of treatment focus addressed in this session included Develop / Improve Independent Living Skills and Physical Health .  Pt reported feeling good and content, noting that his house is clean, which makes him feel good. Pt shared a goal of getting outside and doing some yard work, identifying a potential barrier of it getting too hot outside. Pt reported no safety concerns, no SI or SIB, and no recent substance use.       Therapeutic Interventions/Treatment Strategies:  Psychotherapist offered support, feedback and validation and reinforced use of skills. Treatment modalities used include Motivational Interviewing. Interventions include Coping Skills: Reviewed patients current calming practices and discussed a more formal way of practicing and accessing skills.    Assessment:    Patient response:   Patient responded to session by accepting feedback, listening, focusing on goals and being attentive    Possible barriers to participation / learning include: and no barriers identified    Health Issues:   None reported       Substance Use Review:   Substance Use: alcohol .     Mental Status/Behavioral Observations  Appearance:   Appropriate   Eye Contact:   Good   Psychomotor Behavior: Normal   Attitude:   Cooperative   Orientation:   All  Speech   Rate / Production: Normal    Volume:  Normal   Mood:    Normal  Affect:    Appropriate   Thought Content:   Clear  Thought Form:  Coherent  Logical     Insight:    Good     Plan:     Safety Plan: Recommended that patient call 911 or go to the local ED should there be a change in any of these risk factors.     Barriers to treatment: None identified    Patient Contracts (see media tab):  None    Substance Use: Stage of Change: Action     Continue or Discharge: Patient will continue in Adult Dual Disorder Program (DDP) as planned. Patient is likely to benefit from learning and using skills as they work toward the goals identified in their treatment plan.      Michelle Cadet  June 8, 2020

## 2020-06-08 NOTE — GROUP NOTE
Psychoeducation Group Note    PATIENT'S NAME: Souleymane Zurita  MRN:   [id number]  :   1995  ACCT. NUMBER: [id number]  DATE OF SERVICE: 20  START TIME: 12:00 PM  END TIME: 12:50 PM  FACILITATOR: Henry Greene OTR/L  TOPIC: [facility] OT Group: Lifestyle Balance and Structure  Adult Dual Diagnosis Day Treatment  TRACK: 3    NUMBER OF PARTICIPANTS: 7  Telemedicine Visit: The patient's condition can be safely assessed and treated via synchronous audio and visual telemedicine encounter.      Reason for Telemedicine Visit: Services only offered telehealth    Originating Site (Patient Location): Patient's home    Distant Site (Provider Location): [facility]: [facility]    Consent:  The patient/guardian has verbally consented to: the potential risks and benefits of telemedicine (video visit) versus in person care; bill my insurance or make self-payment for services provided; and responsibility for payment of non-covered services.     Mode of Communication:  Video Conference via [employer]    As the provider I attest to compliance with applicable laws and regulations related to telemedicine.      Summary of Group / Topics Discussed:  Lifestyle Balance and Structure:  OT Goal-setting & integration: To support progress towards treatment goals and psychiatric recovery, group members were led through a weekly structured process to reflect on progress, integrate new learning/skills, and emerging self-awareness into their daily and weekly life. Provided psychoeducation on the neuroscience of change, self-compassion, and the anatomy of a SMART goal to the group. Facilitated the sharing of individual goals with validation, support, and feedback provided.    Patient Session Goals / Objectives:    Reflected on and create a vision for recovery and wellbeing    Identified and wrote 3 SMART goals for the week    Identified and problem solved barriers to achieving identified goals     Identified plan to  support follow through on goals and reflection on progress made        Patient Participation / Response:  Fully participated with the group by sharing personal reflections / insights and openly received / provided feedback with other participants.    Patient presentation: more easily engaged in the group; noted he had followed through with exercise and eating goals for himself; did note challneges with motivation and needed minimal assistance in further exploration of skills and benefits for himself, Verbalized understanding of content and Patient would benefit from additional opportunities to practice the content to be able to generalize it to their everyday life with increased intentionality, consistency, and efficacy in support of their psychiatric recovery    Treatment Plan:  Patient has a current master individualized treatment plan.  See Epic treatment plan for more information.    Henry Greene OTR/L

## 2020-06-08 NOTE — GROUP NOTE
Psychoeducation Group Note    PATIENT'S NAME: Souleymane Zurita  MRN:   [id number]  :   1995  ACCT. NUMBER: [id number]  DATE OF SERVICE: 20  START TIME: 11:00 AM  END TIME: 11:50 AM  FACILITATOR: Bhavya Nash RN  TOPIC: MH RN Group: Brain Health  Adult Dual Diagnosis Day Treatment  TRACK: 3    NUMBER OF PARTICIPANTS: 7  Telemedicine Visit: The patient's condition can be safely assessed and treated via synchronous audio and visual telemedicine encounter.      Reason for Telemedicine Visit: Services only offered telehealth    Originating Site (Patient Location): Patient's home    Distant Site (Provider Location): Provider Remote Setting    Consent:  The patient/guardian has verbally consented to: the potential risks and benefits of telemedicine (video visit) versus in person care; bill my insurance or make self-payment for services provided; and responsibility for payment of non-covered services.     Mode of Communication:  Video Conference via [employer]    As the provider I attest to compliance with applicable laws and regulations related to telemedicine.    Summary of Group / Topics Discussed:  Brain Health:  Pathophysiology of Drugs on the Brain (PART 1 OF 2): Patients were educated on basic neuroscience and the normal role and function of the limbic system, diencephalon, cerebral cortex, and the anatomy of neurons and neurotransmitters. The natural reward circuit was discussed and the effect of drugs, how they work, and how use leads to addiction was also examined. Various pharmacologic, psychotherapeutic, and complementary treatment options were reviewed.     Patient Session Goals / Objectives:  ? Described basic neuroscience with emphasis on the reward system   ? Explained how drugs work in the brain and how chemical/substance use can lead to addiction  ? Identified and described pharmacologic, psychotherapeutic, and complementary treatment options      Patient Participation / Response:  Fully  participated with the group by sharing personal reflections / insights and openly received / provided feedback with other participants.    Demonstrated understanding of topics discussed through group discussion and participation, Identified / Expressed personal readiness to practice skills and Verbalized understanding of brain health topic    Treatment Plan:  Patient has a current master individualized treatment plan.  See Epic treatment plan for more information.    Bhavya Nash RN

## 2020-06-09 ENCOUNTER — HOSPITAL ENCOUNTER (OUTPATIENT)
Dept: BEHAVIORAL HEALTH | Facility: CLINIC | Age: 25
End: 2020-06-09
Attending: PSYCHIATRY & NEUROLOGY
Payer: MEDICAID

## 2020-06-09 PROCEDURE — G0177 OPPS/PHP; TRAIN & EDUC SERV: HCPCS | Mod: GT | Performed by: OCCUPATIONAL THERAPIST

## 2020-06-09 PROCEDURE — 90853 GROUP PSYCHOTHERAPY: CPT | Mod: GT | Performed by: PSYCHOLOGIST

## 2020-06-09 NOTE — GROUP NOTE
Psychoeducation Group Note    PATIENT'S NAME: Souleymane Zurita  MRN:   [id number]  :   1995  ACCT. NUMBER: [id number]  DATE OF SERVICE: 20  START TIME: 11:00 AM  END TIME: 11:50 AM  FACILITATOR: Henry Greene OTR/L  TOPIC: MH Life Skills Group: Lifestyle Balance and Structure  Adult Dual Diagnosis Day Treatment  TRACK: 3    NUMBER OF PARTICIPANTS: 7  Telemedicine Visit: The patient's condition can be safely assessed and treated via synchronous audio and visual telemedicine encounter.      Reason for Telemedicine Visit: Services only offered telehealth    Originating Site (Patient Location): Patient's home    Distant Site (Provider Location): [facility]: [facility]    Consent:  The patient/guardian has verbally consented to: the potential risks and benefits of telemedicine (video visit) versus in person care; bill my insurance or make self-payment for services provided; and responsibility for payment of non-covered services.     Mode of Communication:  Video Conference via Metaset    As the provider I attest to compliance with applicable laws and regulations related to telemedicine.      Summary of Group / Topics Discussed:  Lifestyle Balance and Strucure:  Benefits of Leisure on Mental Health: Patients explored and learned about the benefits and possibilities of leisure activity to create lifestyle balance that supports their mental and physical wellbeing.  Patients were assisted to identify individualized leisure values and interests, recognized the benefits of leisure activity on mental health, and problem solved barriers to leisure engagement and strategies to overcome.  Patient engaged in an experiential leisure activity to gain self-awareness and build milieu social aspects and reflected on the impact the experiential activity had on their mood.       Patient Session Goals / Objectives:    Increased awareness of the importance of engagement in leisure activities to support  lifestyle balance and perceived quality of life    Identified strategies to recognize and challenge barriers to leisure participation     Facilitated exploration of meaningful leisure interests and values    Practiced and reflected on how to generalize taught skills to their everyday life        Patient Participation / Response:  Fully participated with the group by sharing personal reflections / insights and openly received / provided feedback with other participants.    Patient presentation: more engaged and shared about one of his leisure interests had also been his work activity; able to note how he has still engaged in this in leisure at times and made adjustment, Verbalized understanding of content and Patient would benefit from additional opportunities to practice the content to be able to generalize it to their everyday life with increased intentionality, consistency, and efficacy in support of their psychiatric recovery    Treatment Plan:  Patient has a current master individualized treatment plan.  See Epic treatment plan for more information.    Henry Greene OTR/L

## 2020-06-09 NOTE — GROUP NOTE
Process Group Note    PATIENT'S NAME: Souleymane Zurita  MRN:   [id number]  :   1995  ACCT. NUMBER: [id number]  DATE OF SERVICE: 20  START TIME: 12:00 PM  END TIME: 12:50 PM  FACILITATOR: Michelle Cadet; Miguel Angel Galeas LP  TOPIC:  Process Group    Diagnoses:  296.33 (F33.2) Major Depressive Disorder, Recurrent Episode, Severe _  300.02 (F41.1) Generalized Anxiety Disorder   303.90 (F10.20) Alcohol Use Disorder, Severe    Adult Dual Diagnosis Day Treatment  TRACK: 3    NUMBER OF PARTICIPANTS: 7    Telemedicine Visit: The patient's condition can be safely assessed and treated via synchronous audio and visual telemedicine encounter.      Reason for Telemedicine Visit: Services only offered telehealth    Originating Site (Patient Location): Patient's home    Distant Site (Provider Location): [facility]    Consent:  The patient/guardian has verbally consented to: the potential risks and benefits of telemedicine (video visit) versus in person care; bill my insurance or make self-payment for services provided; and responsibility for payment of non-covered services.     Mode of Communication:  Video Conference via [employer]    As the provider I attest to compliance with applicable laws and regulations related to telemedicine.           Data:    Session content: At the start of this group, patients were invited to check in by identifying themselves, describing their current emotional status, and identifying issues to address in this group.   Area(s) of treatment focus addressed in this session included Symptom Management and Abstinence/Relapse Prevention.  Pt reported feeling good, crabby, and grouchy.  Pt shared a goal of exercising today, and anticipates a potential barrier of being easily frustrated, and calming techniques for dealing with frustration were discussed, such as taking a break when it is needed. Pt shared that he was proud of keeping his house cool  "and clean during the heat. Pt reported no safety concerns, no SI or SIB, no recent substance use, and is taking medications as prescribed.     Therapeutic Interventions/Treatment Strategies:  Psychotherapist offered support, feedback and validation and reinforced use of skills. Treatment modalities used include Motivational Interviewing, Cognitive Behavioral Therapy and Dialectical Behavioral Therapy. Interventions include Behavioral Activation: Reinforced benefits/challenges of change process through applying skills to replace unwanted behaviors and Encouraged strategies to reduce individual procrastination and increase motivation by increasing goal-directed activities to enhance mood and reduce symptoms. and Coping Skills: Discussed use of self-soothe skills to decrease distress in the body and Discussed how the use of intentional \"in the moment\" actions can help reduce distress.    Assessment:    Patient response:   Patient responded to session by accepting feedback, giving feedback, listening, focusing on goals, being attentive and accepting support    Possible barriers to participation / learning include: and no barriers identified    Health Issues:   None reported       Substance Use Review:   Substance Use: alcohol .     Mental Status/Behavioral Observations  Appearance:   Appropriate   Eye Contact:   Good   Psychomotor Behavior: Normal   Attitude:   Cooperative   Orientation:   All  Speech   Rate / Production: Normal    Volume:  Normal   Mood:    Normal  Affect:    Appropriate   Thought Content:   Clear  Thought Form:  Coherent  Logical     Insight:    Good     Plan:     Safety Plan: Recommended that patient call 911 or go to the local ED should there be a change in any of these risk factors.     Barriers to treatment: None identified    Patient Contracts (see media tab):  None    Substance Use: Stage of Change: Action     Continue or Discharge: Patient will continue in Adult Dual Disorder Program (DDP) as " planned. Patient is likely to benefit from learning and using skills as they work toward the goals identified in their treatment plan.      Michelle Cadet  June 9, 2020

## 2020-06-09 NOTE — GROUP NOTE
Psychotherapy Group Note    PATIENT'S NAME: Souleymane Zurita  MRN:   [id number]  :   1995  ACCT. NUMBER: [id number]  DATE OF SERVICE: 20  START TIME:  1:00 PM  END TIME:  1:50 PM  FACILITATOR: Miguel Angel Galeas LP  TOPIC:  EBP Group: DDP Relapse Prevention  Adult Dual Diagnosis Day Treatment  TRACK: 3    NUMBER OF PARTICIPANTS: 6    Telemedicine Visit: The patient's condition can be safely assessed and treated via synchronous audio and visual telemedicine encounter.      Reason for Telemedicine Visit: Services only offered telehealth    Originating Site (Patient Location): Patient's home    Distant Site (Provider Location): [facility]: [place]    Consent:  The patient/guardian has verbally consented to: the potential risks and benefits of telemedicine (video visit) versus in person care; bill my insurance or make self-payment for services provided; and responsibility for payment of non-covered services.     Mode of Communication:  Video Conference via [employer]    As the provider I attest to compliance with applicable laws and regulations related to telemedicine.      Summary of Group / Topics Discussed:  DDP Relapse Prevention: Observing and Describing Triggers for Substance Use: Patients explored in greater depth factors that contribute to a relapse including: emotions, thoughts, and situations that trigger substance use. Goal of topic is to assist patients in increased recognition of specific emotions, thoughts, and situations they may experience that increases risk. Patients were able to identify and share their specific emotions, thoughts, and situations with the group. Patients also learned  bridge burning  skill to assist in removing means of acting on substance use.    Patient Session Goals / Objectives:    Verbalized understanding of the importance of awareness of triggers for substance use    Identified their own individual triggers    [place] effective coping  skills in response to triggers for substance use including  bridge burning  skill      Patient Participation / Response:  Fully participated with the group by sharing personal reflections / insights and openly received / provided feedback with other participants.    Demonstrated understanding of topics discussed through group discussion and participation    Treatment Plan:  Patient has a current master individualized treatment plan.  See Epic treatment plan for more information.    Miguel Angel Galeas, LP

## 2020-06-10 ENCOUNTER — HOSPITAL ENCOUNTER (OUTPATIENT)
Dept: BEHAVIORAL HEALTH | Facility: CLINIC | Age: 25
End: 2020-06-10
Attending: PSYCHIATRY & NEUROLOGY
Payer: MEDICAID

## 2020-06-10 PROCEDURE — G0177 OPPS/PHP; TRAIN & EDUC SERV: HCPCS | Mod: GT

## 2020-06-10 PROCEDURE — 90853 GROUP PSYCHOTHERAPY: CPT | Mod: GT | Performed by: PSYCHOLOGIST

## 2020-06-10 NOTE — GROUP NOTE
Psychoeducation Group Note    PATIENT'S NAME: Souleymane Zurita  MRN:   [id number]  :   1995  ACCT. NUMBER: [id number]  DATE OF SERVICE: 6/10/20  START TIME: 11:00 AM  END TIME: 11:50 AM  FACILITATOR: Bhavya Nash RN  TOPIC: MH RN Group: Brain Health  Adult Dual Diagnosis Day Treatment  TRACK: 3    NUMBER OF PARTICIPANTS: 5    Summary of Group / Topics Discussed:  Brain Health:  Pathophysiology of Drugs on the Brain (PART 2 OF 2): Patients were educated on basic neuroscience and the normal role and function of the limbic system, diencephalon, cerebral cortex, and the anatomy of neurons and neurotransmitters. The natural reward circuit was discussed and the effect of drugs, how they work, and how use leads to addiction was also examined. Various pharmacologic, psychotherapeutic, and complementary treatment options were reviewed.     Patient Session Goals / Objectives:  ? Described basic neuroscience with emphasis on the reward system   ? Explained how drugs work in the brain and how chemical/substance use can lead to addiction  ? Identified and described pharmacologic, psychotherapeutic, and complementary treatment options      Patient Participation / Response:  Fully participated with the group by sharing personal reflections / insights and openly received / provided feedback with other participants.    Demonstrated understanding of topics discussed through group discussion and participation, Identified / Expressed personal readiness to practice skills and Verbalized understanding of brain health topic    Treatment Plan:  Patient has a current master individualized treatment plan.  See Epic treatment plan for more information.    Bhavya Nash RN

## 2020-06-10 NOTE — GROUP NOTE
Process Group Note    PATIENT'S NAME: Souleymane Zurita  MRN:   [id number]  :   1995  ACCT. NUMBER: [id number]  DATE OF SERVICE: 6/10/20  START TIME: 12:00 PM  END TIME: 12:50 PM  FACILITATOR: Michelle Cadet; Miguel Angel Galeas LP  TOPIC:  Process Group    Diagnoses:  296.33 (F33.2) Major Depressive Disorder, Recurrent Episode, Severe _  300.02 (F41.1) Generalized Anxiety Disorder   303.90 (F10.20) Alcohol Use Disorder, Severe    Adult Dual Diagnosis Day Treatment  TRACK: 3    NUMBER OF PARTICIPANTS: 6    Telemedicine Visit: The patient's condition can be safely assessed and treated via synchronous audio and visual telemedicine encounter.      Reason for Telemedicine Visit: Services only offered telehealth    Originating Site (Patient Location): Patient's home    Distant Site (Provider Location): [facility]    Consent:  The patient/guardian has verbally consented to: the potential risks and benefits of telemedicine (video visit) versus in person care; bill my insurance or make self-payment for services provided; and responsibility for payment of non-covered services.     Mode of Communication:  Video Conference via [employer]    As the provider I attest to compliance with applicable laws and regulations related to telemedicine.           Data:    Session content: At the start of this group, patients were invited to check in by identifying themselves, describing their current emotional status, and identifying issues to address in this group.   Area(s) of treatment focus addressed in this session included Symptom Management and Abstinence/Relapse Prevention.  Pt reported feeling pretty awake noting that he woke up early naturally.  Pt shared that his air conditioning broke but he was able to fix it, and was happy to be able to keep his house cool.  Pt shared a goal of exercising by practicing his boxing today.  Pt reported no recent substance use, no SI or SIB, and no  safety concerns.     Therapeutic Interventions/Treatment Strategies:  Psychotherapist offered support, feedback and validation and reinforced use of skills. Treatment modalities used include Motivational Interviewing. Interventions include Behavioral Activation: Encouraged strategies to reduce individual procrastination and increase motivation by increasing goal-directed activities to enhance mood and reduce symptoms..    Assessment:    Patient response:   Patient responded to session by accepting feedback, listening, focusing on goals and being attentive    Possible barriers to participation / learning include: and no barriers identified    Health Issues:   None reported       Substance Use Review:   Substance Use: alcohol .     Mental Status/Behavioral Observations  Appearance:   Appropriate   Eye Contact:   Good   Psychomotor Behavior: Normal   Attitude:   Cooperative   Orientation:   All  Speech   Rate / Production: Normal    Volume:  Normal   Mood:    Normal  Affect:    Appropriate   Thought Content:   Clear  Thought Form:  Coherent  Logical     Insight:    Good     Plan:     Safety Plan: Recommended that patient call 911 or go to the local ED should there be a change in any of these risk factors.     Barriers to treatment: None identified    Patient Contracts (see media tab):  None    Substance Use: Stage of Change: Action     Continue or Discharge: Patient will continue in Adult Dual Disorder Program (DDP) as planned. Patient is likely to benefit from learning and using skills as they work toward the goals identified in their treatment plan.      Michelle Cadet  Mónica 10, 2020

## 2020-06-11 ENCOUNTER — HOSPITAL ENCOUNTER (OUTPATIENT)
Dept: BEHAVIORAL HEALTH | Facility: CLINIC | Age: 25
End: 2020-06-11
Attending: PSYCHIATRY & NEUROLOGY
Payer: MEDICAID

## 2020-06-11 PROCEDURE — G0177 OPPS/PHP; TRAIN & EDUC SERV: HCPCS | Mod: GT | Performed by: OCCUPATIONAL THERAPIST

## 2020-06-11 PROCEDURE — G0177 OPPS/PHP; TRAIN & EDUC SERV: HCPCS | Mod: GT

## 2020-06-11 NOTE — GROUP NOTE
Psychoeducation Group Note    PATIENT'S NAME: Souleymane Zurita  MRN:   [id number]  :   1995  ACCT. NUMBER: [id number]  DATE OF SERVICE: 20  START TIME: 12:00 PM  END TIME: 12:50 PM  FACILITATOR: Henry Greene OTR/L  TOPIC: MH Life Skills Group: Lifestyle Balance and Structure  Adult Dual Diagnosis Day Treatment  TRACK: 3    NUMBER OF PARTICIPANTS: 7  Telemedicine Visit: The patient's condition can be safely assessed and treated via synchronous audio and visual telemedicine encounter.      Reason for Telemedicine Visit: Services only offered telehealth    Originating Site (Patient Location): Patient's home    Distant Site (Provider Location): [facility]: [facility]    Consent:  The patient/guardian has verbally consented to: the potential risks and benefits of telemedicine (video visit) versus in person care; bill my insurance or make self-payment for services provided; and responsibility for payment of non-covered services.     Mode of Communication:  Video Conference via [employer]    As the provider I attest to compliance with applicable laws and regulations related to telemedicine.      Summary of Group / Topics Discussed:  Lifestyle Balance and Strucure:  Benefits of Leisure on Mental Health: Leisure Resource Inventory: Patients explored and gained awareness of leisure values, benefits, and interests focusing on specific areas of self-development, self-enjoyment, self-support, and self-expenditure.   Patients identified specific activities and skills to employ for effective use of leisure for mental health management and quality of life.      Patient Session Goals / Objectives:    Increased awareness of the importance of engagement in leisure activities to support lifestyle balance and perceived quality of life    Identified specific leisure activities and skills to support mental health management      Facilitated exploration of meaningful leisure interests and  values    Practiced and reflected on how to generalize taught skills to their everyday life      Patient Participation / Response:  Fully participated with the group by sharing personal reflections / insights and openly received / provided feedback with other participants.    Patient presentation: able to note some past leisure interests he has had and open to explore for the future, Verbalized understanding of content and Patient would benefit from additional opportunities to practice the content to be able to generalize it to their everyday life with increased intentionality, consistency, and efficacy in support of their psychiatric recovery    Treatment Plan:  Patient has a current master individualized treatment plan.  See Epic treatment plan for more information.    Henry Greene, OTR/L

## 2020-06-11 NOTE — GROUP NOTE
Psychoeducation Group Note    PATIENT'S NAME: Souleymane Zurita  MRN:   [id number]  :   1995  ACCT. NUMBER: [id number]  DATE OF SERVICE: 20  START TIME: 11:00 AM  END TIME: 11:50 AM  FACILITATOR: Bhavya Nash RN  TOPIC: DYLAN RN Group: Health Maintenance  Adult Dual Diagnosis Day Treatment  TRACK: 3    NUMBER OF PARTICIPANTS: 7    Telemedicine Visit: The patient's condition can be safely assessed and treated via synchronous audio and visual telemedicine encounter.      Reason for Telemedicine Visit: Services only offered telehealth    Originating Site (Patient Location): Patient's home    Distant Site (Provider Location): Provider Remote Setting    Consent:  The patient/guardian has verbally consented to: the potential risks and benefits of telemedicine (video visit) versus in person care; bill my insurance or make self-payment for services provided; and responsibility for payment of non-covered services.     Mode of Communication:  Video Conference via [employer]    As the provider I attest to compliance with applicable laws and regulations related to telemedicine.    Summary of Group / Topics Discussed:  Health Maintenance: Weekend planning: Patients were given time to complete a weekend plan of what they will do to promote wellness and sobriety over the weekend when they do not have the structure of group. Patients were encouraged to review progress on their treatment goals and were challenged to identify ways to work toward meeting them. Patients identified and discussed foreseeable barriers to success over the weekend and then developed a plan to overcome them. Patients reviewed their distress coping skills and social support network and discussed this with the group.       Patient Session Goals / Objectives:    ?    Identified activities to engage in that promote balance in wellness  ?    Distinguished possible barriers to success over the weekend and created a plan to overcome them  ?    Listed  distress coping skills and identified social support network to utilize if in crisis during the weekend      Patient Participation / Response:  Fully participated with the group by sharing personal reflections / insights and openly received / provided feedback with other participants.    Demonstrated understanding of topics discussed through group discussion and participation, Identified / Expressed personal readiness to practice skills and Verbalized understanding of health maintenance topic    Treatment Plan:  Patient has a current master individualized treatment plan.  See Epic treatment plan for more information.    Bhavya Nash RN

## 2020-06-12 NOTE — ADDENDUM NOTE
Encounter addended by: Miguel Angel Galeas LP on: 6/12/2020 8:29 AM   Actions taken: Flowsheet accepted

## 2020-06-15 ENCOUNTER — HOSPITAL ENCOUNTER (OUTPATIENT)
Dept: BEHAVIORAL HEALTH | Facility: CLINIC | Age: 25
End: 2020-06-15
Attending: PSYCHIATRY & NEUROLOGY
Payer: MEDICAID

## 2020-06-15 PROCEDURE — G0177 OPPS/PHP; TRAIN & EDUC SERV: HCPCS | Mod: GT | Performed by: OCCUPATIONAL THERAPIST

## 2020-06-15 PROCEDURE — 90853 GROUP PSYCHOTHERAPY: CPT | Mod: GT | Performed by: PSYCHOLOGIST

## 2020-06-15 PROCEDURE — G0177 OPPS/PHP; TRAIN & EDUC SERV: HCPCS | Mod: GT

## 2020-06-15 NOTE — GROUP NOTE
Psychoeducation Group Note    PATIENT'S NAME: Souleymane Zurita  MRN:   [id number]  :   1995  ACCT. NUMBER: [id number]  DATE OF SERVICE: 6/15/20  START TIME: 12:00 PM  END TIME: 12:50 PM  FACILITATOR: Marline Darling OTR/L  TOPIC: MH Life Skills Group: Sensory Approaches in Mental Health  Adult Dual Diagnosis Day Treatment  TRACK: 3    NUMBER OF PARTICIPANTS: 5    Telemedicine Visit: The patient's condition can be safely assessed and treated via synchronous audio and visual telemedicine encounter.      Reason for Telemedicine Visit: Services only offered telehealth    Originating Site (Patient Location): Patient's home    Distant Site (Provider Location): [facility]: [facility]    Consent:  The patient/guardian has verbally consented to: the potential risks and benefits of telemedicine (video visit) versus in person care; bill my insurance or make self-payment for services provided; and responsibility for payment of non-covered services.     Mode of Communication:  Video Conference via [employer]    As the provider I attest to compliance with applicable laws and regulations related to telemedicine.     Summary of Group / Topics Discussed:  Sensory Approaches in Mental Health:  Coping Through the Senses Introduction: Patients were introduced and taught about neurosensory based skills and strategies related to supporting effective self regulation skills.  Patients were taught about the eight sensory systems and how they can be used for coping with mental health symptoms and stressors.      Patient Session Goals / Objectives:    Identified specific and individualized neurosensory skills to help when distressed      Identified skills learned and how this applies to current daily life    Established a plan for practice of these skills in their own environments        Patient Participation / Response:  Minimally participated, only when prompted / asked.    Patient presentation:  constricted affect; quiet in group unless asked direct questions and Patient would benefit from additional opportunities to practice the content to be able to generalize it to their everyday life with increased intentionality, consistency, and efficacy in support of their psychiatric recovery    Treatment Plan:  Patient has a current master individualized treatment plan.  See Epic treatment plan for more information.    Marline Darling, OTR/L

## 2020-06-15 NOTE — GROUP NOTE
"Process Group Note    PATIENT'S NAME: Souleymane Zurita  MRN:   [id number]  :   1995  ACCT. NUMBER: [id number]  DATE OF SERVICE: 6/15/20  START TIME:  1:00 PM  END TIME:  1:50 PM  FACILITATOR: Olivia Cadet Jeffrey Allen Carlson, LP  TOPIC:  Process Group    Diagnoses:  296.33 (F33.2) Major Depressive Disorder, Recurrent Episode, Severe _  300.02 (F41.1) Generalized Anxiety Disorder   303.90 (F10.20) Alcohol Use Disorder, Severe    Adult Dual Diagnosis Day Treatment  TRACK: 3    NUMBER OF PARTICIPANTS: 6    Telemedicine Visit: The patient's condition can be safely assessed and treated via synchronous audio and visual telemedicine encounter.      Reason for Telemedicine Visit: Services only offered telehealth    Originating Site (Patient Location): Patient's home    Distant Site (Provider Location): [facility]    Consent:  The patient/guardian has verbally consented to: the potential risks and benefits of telemedicine (video visit) versus in person care; bill my insurance or make self-payment for services provided; and responsibility for payment of non-covered services.     Mode of Communication:  Video Conference via [employer]    As the provider I attest to compliance with applicable laws and regulations related to telemedicine.           Data:    Session content: At the start of this group, patients were invited to check in by identifying themselves, describing their current emotional status, and identifying issues to address in this group.   Area(s) of treatment focus addressed in this session included Abstinence/Relapse Prevention.  Pt reported feeling \"not the best\" after relapsing on Friday, noting that he felt bored and lonely at home alone and decided to drink. Pt identified a goal of getting through the day without drinking. Pt reported no safety concerns, and no SI or SIB.      Therapeutic Interventions/Treatment Strategies:  Psychotherapist offered support, " feedback and validation and reinforced use of skills. Treatment modalities used include Motivational Interviewing, Cognitive Behavioral Therapy and Dialectical Behavioral Therapy. Interventions include Coping Skills: Facilitated understanding of  what factors may contribute to symptom relapse and skills plan to manage symptom relapse  and Relapse Prevention: Facilitated understanding the importance of awareness of factors that contribute to relapse .    Assessment:    Patient response:   Patient responded to session by accepting feedback, listening, focusing on goals and accepting support    Possible barriers to participation / learning include: and no barriers identified    Health Issues:   None reported       Substance Use Review:   Substance Use: alcohol .  and Last use: 6/12/2020    Mental Status/Behavioral Observations  Appearance:   Appropriate   Eye Contact:   Fair   Psychomotor Behavior: Normal   Attitude:   Cooperative   Orientation:   All  Speech   Rate / Production: Normal    Volume:  Normal   Mood:    Normal  Affect:    Appropriate   Thought Content:   Clear  Thought Form:  Coherent  Logical     Insight:    Fair     Plan:     Safety Plan: Recommended that patient call 911 or go to the local ED should there be a change in any of these risk factors.     Barriers to treatment: None identified    Patient Contracts (see media tab):  None    Substance Use: Stage of Change: Preparatory     Continue or Discharge: Patient will continue in Adult Dual Disorder Program (DDP) as planned. Patient is likely to benefit from learning and using skills as they work toward the goals identified in their treatment plan.      Michelle Cadet  Mónica 15, 2020

## 2020-06-15 NOTE — GROUP NOTE
Psychoeducation Group Note    PATIENT'S NAME: Souleymane Zurita  MRN:   [id number]  :   1995  ACCT. NUMBER: [id number]  DATE OF SERVICE: 6/15/20  START TIME: 11:00 AM  END TIME: 11:50 AM  FACILITATOR: Bhavya Nash RN  TOPIC: DYLAN RN Group: Medication Education and Management  Adult Dual Diagnosis Day Treatment  TRACK: 3    NUMBER OF PARTICIPANTS: 6    Telemedicine Visit: The patient's condition can be safely assessed and treated via synchronous audio and visual telemedicine encounter.      Reason for Telemedicine Visit: Services only offered telehealth    Originating Site (Patient Location): Patient's home    Distant Site (Provider Location): Provider Remote Setting    Consent:  The patient/guardian has verbally consented to: the potential risks and benefits of telemedicine (video visit) versus in person care; bill my insurance or make self-payment for services provided; and responsibility for payment of non-covered services.     Mode of Communication:  Video Conference via [employer]    As the provider I attest to compliance with applicable laws and regulations related to telemedicine.    Summary of Group / Topics Discussed:  Medication Educations and Management:  Medication Jeopardy (PART 1 OF 2): Patients provided education regarding medication safety, antidepressants, side effects, neuroleptics, expected medication outcomes, knowledge of diagnosis, symptoms, and symptom management through an engaging jeopardy-style format.     Patient Session Goals / Objectives:    ? Participated in team-based Jeopardy game  ? Identified strategies for safe use, handling, and disposal of medications  ? Discussed basic aspects of medication safety, side effects, adverse outcomes and contraindications      Patient Participation / Response:  Fully participated with the group by sharing personal reflections / insights and openly received / provided feedback with other participants.     Demonstrated understanding of topics  discussed through group discussion and participation, Identified / Expressed personal readiness to practice skills and Verbalized understanding of medication education and management topic    Treatment Plan:  Patient has a current master individualized treatment plan.  See Epic treatment plan for more information.    Bhavya Nash RN

## 2020-06-17 ENCOUNTER — HOSPITAL ENCOUNTER (OUTPATIENT)
Dept: BEHAVIORAL HEALTH | Facility: CLINIC | Age: 25
End: 2020-06-17
Attending: PSYCHIATRY & NEUROLOGY
Payer: MEDICAID

## 2020-06-17 PROCEDURE — 90853 GROUP PSYCHOTHERAPY: CPT | Mod: GT | Performed by: PSYCHOLOGIST

## 2020-06-17 PROCEDURE — G0177 OPPS/PHP; TRAIN & EDUC SERV: HCPCS | Mod: GT

## 2020-06-17 NOTE — GROUP NOTE
Psychoeducation Group Note    PATIENT'S NAME: Souleymane Zurita  MRN:   [id number]  :   1995  ACCT. NUMBER: [id number]  DATE OF SERVICE: 20  START TIME: 11:00 AM  END TIME: 11:50 AM  FACILITATOR: Bhavya Nash RN  TOPIC: DYLAN RN Group: Medication Education and Management  Adult Dual Diagnosis Day Treatment  TRACK: 3    NUMBER OF PARTICIPANTS: 7    Telemedicine Visit: The patient's condition can be safely assessed and treated via synchronous audio and visual telemedicine encounter.      Reason for Telemedicine Visit: Services only offered telehealth    Originating Site (Patient Location): Patient's home    Distant Site (Provider Location): Provider Remote Setting    Consent:  The patient/guardian has verbally consented to: the potential risks and benefits of telemedicine (video visit) versus in person care; bill my insurance or make self-payment for services provided; and responsibility for payment of non-covered services.     Mode of Communication:  Video Conference via [employer]    As the provider I attest to compliance with applicable laws and regulations related to telemedicine.    Summary of Group / Topics Discussed:  Medication Educations and Management:  Medication Jeopardy (PART 2 OF 2): Patients provided education regarding medication safety, antidepressants, side effects, neuroleptics, expected medication outcomes, knowledge of diagnosis, symptoms, and symptom management through an engaging jeopardy-style format.     Patient Session Goals / Objectives:    ? Participated in team-based Jeopardy game  ? Identified strategies for safe use, handling, and disposal of medications  ? Discussed basic aspects of medication safety, side effects, adverse outcomes and contraindications      Patient Participation / Response:  Fully participated with the group by sharing personal reflections / insights and openly received / provided feedback with other participants.     Demonstrated understanding of topics  discussed through group discussion and participation, Identified / Expressed personal readiness to practice skills and Verbalized understanding of medication education and management topic    Treatment Plan:  Patient has a current master individualized treatment plan.  See Epic treatment plan for more information.    Bhavya Nash RN

## 2020-06-17 NOTE — GROUP NOTE
Psychotherapy Group Note    PATIENT'S NAME: Souleymane Zurita  MRN:   [id number]  :   1995  ACCT. NUMBER: [id number]  DATE OF SERVICE: 20  START TIME:  1:00 PM  END TIME:  1:50 PM  FACILITATOR: Olivia Cadet Jeffrey Allen Carlson, LP  TOPIC: MH EBP Group: Mindfulness  Adult Dual Diagnosis Day Treatment  TRACK: 3    NUMBER OF PARTICIPANTS: 6    Summary of Group / Topics Discussed:  Mindfulness: What is Mindfulness: Patients received an overview on what mindfulness is and how mindfulness can benefit general health, mental health symptoms, and stressors. The history of mindfulness, its application to mental health therapies, and key concepts were also discussed. Patients discussed current awareness, knowledge, and practice of mindfulness skills. Patients also discussed barriers to mindfulness practice.     Patient Session Goals / Objectives:    Demonstrated understanding of key concepts and application to daily life    Identified when/how to use mindfulness     Resolved barriers to practice    Identified plan to use mindfulness in daily life      Telemedicine Visit: The patient's condition can be safely assessed and treated via synchronous audio and visual telemedicine encounter.      Reason for Telemedicine Visit: Services only offered telehealth    Originating Site (Patient Location): Patient's home    Distant Site (Provider Location): [facility]    Consent:  The patient/guardian has verbally consented to: the potential risks and benefits of telemedicine (video visit) versus in person care; bill my insurance or make self-payment for services provided; and responsibility for payment of non-covered services.     Mode of Communication:  Video Conference via [employer]    As the provider I attest to compliance with applicable laws and regulations related to telemedicine.       Patient Participation / Response:  Minimally participated, only when prompted /  asked.    Demonstrated understanding of topics discussed through group discussion and participation    Treatment Plan:  Patient has a current master individualized treatment plan.  See Epic treatment plan for more information.    Michelle Cadet

## 2020-06-17 NOTE — GROUP NOTE
"Process Group Note    PATIENT'S NAME: Souleymane Zurita  MRN:   [id number]  :   1995  ACCT. NUMBER: [id number]  DATE OF SERVICE: 20  START TIME: 12:00 PM  END TIME: 12:50 PM  FACILITATOR: Olivia Cadet Jeffrey Allen Carlson, LP  TOPIC:  Process Group    Diagnoses:  296.33 (F33.2) Major Depressive Disorder, Recurrent Episode, Severe _  300.02 (F41.1) Generalized Anxiety Disorder   303.90 (F10.20) Alcohol Use Disorder, Severe    Adult Dual Diagnosis Day Treatment  TRACK: 3    NUMBER OF PARTICIPANTS: 6    Telemedicine Visit: The patient's condition can be safely assessed and treated via synchronous audio and visual telemedicine encounter.      Reason for Telemedicine Visit: Services only offered telehealth    Originating Site (Patient Location): Patient's home    Distant Site (Provider Location): [facility]    Consent:  The patient/guardian has verbally consented to: the potential risks and benefits of telemedicine (video visit) versus in person care; bill my insurance or make self-payment for services provided; and responsibility for payment of non-covered services.     Mode of Communication:  Video Conference via [email or web address]    As the provider I attest to compliance with applicable laws and regulations related to telemedicine.           Data:    Session content: At the start of this group, patients were invited to check in by identifying themselves, describing their current emotional status, and identifying issues to address in this group.   Area(s) of treatment focus addressed in this session included Abstinence/Relapse Prevention and Develop Socialization / Interpersonal Relationship Skills.  Pt reported feeling \"pretty good\", and identified a goal of purchasing a new vehicle.  Pt shared about his relapse on Friday and Saturday, reporting that he was feeling stuck at home, bored, anxious, and lonely, and sobriety became less important to him, which lead him to " "drink a pint of vodka on Friday and another on Saturday evening.  Pt shared that he has a lack of a friend group and does not have a vehicle currently, so getting out of the house has been difficult, and that drinking was a way for him to escape from those feelings at the time.  Pt shared that he doesn't feel about about the relapse, but does have a goal of sobriety.  Pt reported no safety concerns, no SI or SIB, and is taking medications as prescribed.       Therapeutic Interventions/Treatment Strategies:  Psychotherapist offered support, feedback and validation and reinforced use of skills. Treatment modalities used include Motivational Interviewing, Cognitive Behavioral Therapy and Dialectical Behavioral Therapy. Interventions include Behavioral Activation: Encouraged strategies to reduce individual procrastination and increase motivation by increasing goal-directed activities to enhance mood and reduce symptoms., Coping Skills: Discussed how the use of intentional \"in the moment\" actions can help reduce distress and Facilitated understanding of  what factors may contribute to symptom relapse and skills plan to manage symptom relapse , Relapse Prevention: Facilitated understanding the importance of awareness of factors that contribute to relapse  and Assisted patient in identifying personal vulnerabilities, thoughts, emotions, and situations that may lead to relapse  and Relationship Skills: Discussed strategies to promote healthier understanding of interpersonal relationships.    Assessment:    Patient response:   Patient responded to session by accepting feedback, listening, focusing on goals, being attentive and accepting support    Possible barriers to participation / learning include: and no barriers identified    Health Issues:   None reported       Substance Use Review:   Substance Use: alcohol .  and Last use: 6/12/2020 and 6/13/2020    Mental Status/Behavioral Observations  Appearance:   Appropriate "   Eye Contact:   Good   Psychomotor Behavior: Normal   Attitude:   Cooperative   Orientation:   All  Speech   Rate / Production: Normal    Volume:  Normal   Mood:    Normal  Affect:    Appropriate   Thought Content:   Clear  Thought Form:  Coherent  Logical     Insight:    Good     Plan:     Safety Plan: Recommended that patient call 911 or go to the local ED should there be a change in any of these risk factors.     Barriers to treatment: None identified    Patient Contracts (see media tab):  None    Substance Use: Stage of Change: Preparatory and Action     Continue or Discharge: Patient will continue in Adult Dual Disorder Program (DDP) as planned. Patient is likely to benefit from learning and using skills as they work toward the goals identified in their treatment plan.      Michelle Cadet  June 17, 2020

## 2020-06-18 ENCOUNTER — HOSPITAL ENCOUNTER (OUTPATIENT)
Dept: BEHAVIORAL HEALTH | Facility: CLINIC | Age: 25
End: 2020-06-18
Attending: PSYCHIATRY & NEUROLOGY
Payer: MEDICAID

## 2020-06-19 ENCOUNTER — TELEPHONE (OUTPATIENT)
Dept: BEHAVIORAL HEALTH | Facility: CLINIC | Age: 25
End: 2020-06-19

## 2020-06-19 NOTE — TELEPHONE ENCOUNTER
Tried calling patient to complete RN Physical Health Screening Questions.  Patient did not answer.    Bhavya Nash RN on 6/19/2020 at 3:08 PM

## 2020-06-19 NOTE — TELEPHONE ENCOUNTER
Phoned pts mother and she was at pts home. Pt is not thinking this (MICD IOP) is helpful. She reports he is discharging and pursuing residential treatment.

## 2020-06-25 NOTE — DISCHARGE SUMMARY
Adult Dual Disorder Program Discharge Summary / Instructions     Patient: Souleymane Zurita MRN: [id number]  : 1995 Age:  25 year old Sex:  male    Admission Date: 20  Discharge Date: 20    Reason for Discharge: Patient/Client decision      Against Medical Advice         Prognosis: Guarded      Client Progress Toward AchievingTreatment Plan Goals / Dimensions Risk Scale       Souleymane attended 35 of 57 (61%) scheduled MICD groups. Absences were due to tech issue, no show..      Diagnosis:   296.33 (F33.2) Major Depressive Disorder, Recurrent Episode, Severe _  300.02 (F41.1) Generalized Anxiety Disorder   303.90 (F10.20) Alcohol Use Disorder, Severe    Individualized Treatment Plan Goals/Progress:      Area of Treatment Focus:   Personal Safety  Start Date:    6/3/2020    Dimension:   III. Emotional / Behavioral Condition    Description:    Pt has a hx of S/I. He reports last S/I was last week. Pt is agreeable to following safety plan.     Goal:  Target Date: 20 Status: Stopped  Client will use coping plan for safety, as needed.      Progress:   20 pt discharged, goal stopped    Treatment Strategies:   Assess / reassess level of potential for harm to self or others  Engage in safety planning when indicated  Teach adaptive coping skills and communication skills        Area of Treatment Focus:   Symptom Stabilization and Management  Start Date:    20    Dimension:   III. Emotional / Behavioral Condition    Description:    Pt reports anxiety and depression. Pt reports he is depressed most of the time. He reports low motivation, sleeps excessively, and has anxiety attacks.     Goal:  Target Date: 20 Status: Stopped  Will Improve Mindfulness / Stay in the Here and Now Regularly practice intentionally focusing on what is occurring in the environment, what you are sensing, thoughts that are popping into your head, emotions that you are feeling, without judging any of it, just noticing it.   Regular practice is ideally stopping to do this on schedule, 4 times a day for four minutes each time.      Progress:   6/25/20 GOAL STOPPED    Treatment Strategies:   Assist to identify treatment goals  Assess / reassess for appropriate therapy program involvement, encourage participation in therapies  Facilitate increased self awareness  Teach adaptive coping skills and communication skills        Area of Treatment Focus:   Abstinence / Relapse Prevention  Start Date:    6/2/2020    Dimension:   V. Relapse    Description:    Pt reports he had problems with alcohol.     Goal:  Target Date: 6/30/20 Status: Stopped  Pt will develop additional relapse prevention skills by target date.       Progress:   6/25/20 GOAL STOPPED    Treatment Strategies:   Assist clients in establishing / strengthening support network  Assist to identify treatment goals  Facilitate increased self awareness  Utilize and reinforce no-use contract for substance use / abuse        Area of Treatment Focus:   Community Resources / Support and Discharge Planning  Start Date:    6/2/20    Dimension:   VI. Recovery Environment    Description:    Pt reports he does not have a therapist. He will obtain one by target date. Pt reports he plans to attend AA.     Goal:  Target Date: 6/30/20 Status: STOPPED  Pt will obtain a therapist by target date. He will attend online meetings weekly.       Progress:   6/25/20 GOAL STOPPED    Treatment Strategies:   Assist clients in establishing / strengthening support network  Assist with discharge planning         Miguel Angel Galeas LP         Admit Discharge   PHQ-9 7 -   DEVORAH-7 14 -   CGI 5 5         Additional Comments: none    Completed By: Michael Galeas MA, LP [facility]

## 2020-06-25 NOTE — DISCHARGE SUMMARY
Adult Dual Disorder Program   Discharge Summary/Instructions     Patient: Souleymane Zurita MRN: [id number]  : 1995 Age:  25 year old Sex:  male    Admission Date: 20  Discharge Date: 20  Diagnosis:   296.33 (F33.2) Major Depressive Disorder, Recurrent Episode, Severe _  300.02 (F41.1) Generalized Anxiety Disorder   303.90 (F10.20) Alcohol Use Disorder, Severe    Focus of Treatment / Discharge Recommendations: Residential MICD [place]    Personal Safety/ Management of Symptoms:    * Follow your safety plan.  Report increased symptoms to your care team                    and/or use the crisis resources listed below.    Crisis Resources:    Suicide Prevention Lifeline: 4-243-196-TALK (0080)    Crisis Text Line Service (available 24 hours a day, 7 days a week): Text [place] to 667553    Call  **CRISIS (932720) from a cell phone to talk to a team of professionals who can help you.  Crisis Services By [facility]: Phone Number:   [place]     [phone]   [place]    [phone]   [place]    [phone]   [place]    [phone]   [place]    [phone]   [place] [phone]   [place]     [phone]       Call 911 or go to my nearest emergency department.     Managing Symptoms / Abstinence / Preventing Relapse:    Take all medicines as directed.  Carry a current list of medicines with you.    Use coping skills: distraction, scheduling, breathing.     Do not use illicit (street) drugs, controlled substances (narcotics) or alcohol.    Go to all appointments.    Report symptoms to your care team including: thoughts of suicide, loss of sleep, increased confusion, or mood getting worse.    Develop/Improve Independent Living/Socialization Skills: Structuring day    Community Resources/Supports and Discharge Planning:    Follow up with psychiatrist / main caregiver: ALESSANDRO    Next visit: ALESSANDRO    Follow up with your therapist: ALESSANDRO   Next visit: ALESSANDRO    Go to group therapy and / or support groups at: NA    See your medical doctor  about:  No issues    Other:  NA    Copy of summary sent to: NA      Client Signature:_________________________________   Date / Time:___________    Staff Signature:__________________________________   Date / Time:___________